# Patient Record
Sex: FEMALE | Race: WHITE | Employment: FULL TIME
[De-identification: names, ages, dates, MRNs, and addresses within clinical notes are randomized per-mention and may not be internally consistent; named-entity substitution may affect disease eponyms.]

---

## 2017-07-08 ENCOUNTER — HEALTH MAINTENANCE LETTER (OUTPATIENT)
Age: 57
End: 2017-07-08

## 2017-07-24 ENCOUNTER — OFFICE VISIT (OUTPATIENT)
Dept: SLEEP MEDICINE | Facility: CLINIC | Age: 57
End: 2017-07-24
Payer: COMMERCIAL

## 2017-07-24 VITALS
SYSTOLIC BLOOD PRESSURE: 134 MMHG | TEMPERATURE: 98.1 F | OXYGEN SATURATION: 97 % | HEIGHT: 67 IN | HEART RATE: 72 BPM | WEIGHT: 286 LBS | DIASTOLIC BLOOD PRESSURE: 82 MMHG | BODY MASS INDEX: 44.89 KG/M2

## 2017-07-24 DIAGNOSIS — G47.33 OSA (OBSTRUCTIVE SLEEP APNEA): Primary | ICD-10-CM

## 2017-07-24 PROCEDURE — 99243 OFF/OP CNSLTJ NEW/EST LOW 30: CPT | Performed by: PSYCHIATRY & NEUROLOGY

## 2017-07-24 RX ORDER — SERTRALINE HYDROCHLORIDE 100 MG/1
100 TABLET, FILM COATED ORAL DAILY
COMMUNITY

## 2017-07-24 RX ORDER — ASPIRIN 81 MG/1
81 TABLET ORAL DAILY
COMMUNITY

## 2017-07-24 RX ORDER — POLYETHYLENE GLYCOL 3350 17 G/17G
17 POWDER, FOR SOLUTION ORAL DAILY
COMMUNITY

## 2017-07-24 RX ORDER — ACETAMINOPHEN 500 MG
500-1000 TABLET ORAL EVERY 6 HOURS PRN
COMMUNITY
End: 2017-09-11

## 2017-07-24 RX ORDER — DEXTROAMPHETAMINE SACCHARATE, AMPHETAMINE ASPARTATE MONOHYDRATE, DEXTROAMPHETAMINE SULFATE AND AMPHETAMINE SULFATE 5; 5; 5; 5 MG/1; MG/1; MG/1; MG/1
20 CAPSULE, EXTENDED RELEASE ORAL DAILY
COMMUNITY

## 2017-07-24 RX ORDER — ATORVASTATIN CALCIUM 40 MG/1
40 TABLET, FILM COATED ORAL DAILY
COMMUNITY
End: 2017-09-07

## 2017-07-24 NOTE — PATIENT INSTRUCTIONS

## 2017-07-24 NOTE — NURSING NOTE
"Chief Complaint   Patient presents with     Sleep Problem     Referred by Dr. Borrero, Insomnia and fatigue       Initial /82  Pulse 72  Temp 98.1  F (36.7  C) (Oral)  Ht 1.689 m (5' 6.5\")  Wt 129.7 kg (286 lb)  SpO2 97%  BMI 45.47 kg/m2 Estimated body mass index is 45.47 kg/(m^2) as calculated from the following:    Height as of this encounter: 1.689 m (5' 6.5\").    Weight as of this encounter: 129.7 kg (286 lb).  Medication Reconciliation: complete   Neck 42cm  ESS 6/24  Yanique Mason MA      "

## 2017-07-24 NOTE — MR AVS SNAPSHOT
After Visit Summary   7/24/2017    Jacquie Giang    MRN: [id number]           Patient Information     Date Of Birth          1960        Visit Information        Provider Department      7/24/2017 2:00 PM Kodi Bentley MD [facility]        Today's Diagnoses     ASHWIN (obstructive sleep apnea)    -  1      Care Instructions      Your BMI is Body mass index is 45.47 kg/(m^2).  Weight management is a personal decision.  If you are interested in exploring weight loss strategies, the following discussion covers the approaches that may be successful. Body mass index (BMI) is one way to tell whether you are at a healthy weight, overweight, or obese. It measures your weight in relation to your height.  A BMI of 18.5 to 24.9 is in the healthy range. A person with a BMI of 25 to 29.9 is considered overweight, and someone with a BMI of 30 or greater is considered obese. More than two-thirds of American adults are considered overweight or obese.  Being overweight or obese increases the risk for further weight gain. Excess weight may lead to heart disease and diabetes.  Creating and following plans for healthy eating and physical activity may help you improve your health.  Weight control is part of healthy lifestyle and includes exercise, emotional health, and healthy eating habits. Careful eating habits lifelong are the mainstay of weight control. Though there are significant health benefits from weight loss, long-term weight loss with diet alone may be very difficult to achieve- studies show long-term success with dietary management in less than 10% of people. Attaining a healthy weight may be especially difficult to achieve in those with severe obesity. In some cases, medications, devices and surgical management might be considered.  What can you do?  If you are overweight or obese and are interested in methods for weight loss, you should discuss this with your provider.      Consider reducing daily calorie intake by 500 calories.     Keep a food journal.     Avoiding skipping meals, consider cutting portions instead.    Diet combined with exercise helps maintain muscle while optimizing fat loss. Strength training is particularly important for building and maintaining muscle mass. Exercise helps reduce stress, increase energy, and improves fitness. Increasing exercise without diet control, however, may not burn enough calories to loose weight.       Start walking three days a week 10-20 minutes at a time    Work towards walking thirty minutes five days a week     Eventually, increase the speed of your walking for 1-2 minutes at time    In addition, we recommend that you review healthy lifestyles and methods for weight loss available through the National Institutes of Health patient information sites:  http://win.niddk.nih.gov/publications/index.htm    And look into health and wellness programs that may be available through your health insurance provider, employer, local community center, or blake club.    Weight management plan: Patient was referred to their PCP to discuss a diet and exercise plan.              Follow-ups after your visit        Your next 10 appointments already scheduled     Aug 24, 2017  9:00 AM CDT   New Patient Visit with [facility]   [facility] ([facility])    [facility] [address]  [address]  [address]  [facility] [address]   [phone]            Aug 25, 2017  8:30 PM CDT   PSG Split with BED 4 SH SLEEP   [facility])    [address]  [facility] [address]   [phone]            Sep 11, 2017  1:30 PM CDT   Return Sleep Patient with Kodi Bentley MD   [facility])    [address]  [facility] [address]   [phone]              Future tests  "that were ordered for you today     Open Future Orders        Priority Expected Expires Ordered    Comprehensive Sleep Study Routine  2018            Who to contact     If you have questions or need follow up information about today's clinic visit or your schedule please contact [facility] directly at [phone].  Normal or non-critical lab and imaging results will be communicated to you by MyChart, letter or phone within 4 business days after the clinic has received the results. If you do not hear from us within 7 days, please contact the clinic through [email or web address] or phone. If you have a critical or abnormal lab result, we will notify you by phone as soon as possible.  Submit refill requests through Flud or call your pharmacy and they will forward the refill request to us. Please allow 3 business days for your refill to be completed.          Additional Information About Your Visit        [email or web address] Information     Flud lets you send messages to your doctor, view your test results, renew your prescriptions, schedule appointments and more. To sign up, go to [email or web address] . Click on \"Log in\" on the left side of the screen, which will take you to the Welcome page. Then click on \"Sign up Now\" on the right side of the page.     You will be asked to enter the access code listed below, as well as some personal information. Please follow the directions to create your username and password.     Your access code is: [email or web address]  Expires: 10/22/2017  5:09 PM     Your access code will  in 90 days. If you need help or a new code, please call your [facility] clinic or [phone].        Care EveryWhere ID     This is your Care EveryWhere ID. This could be used by other organizations to access your [facility] medical records  [id number]        Your Vitals Were     Pulse Temperature Height Pulse Oximetry BMI (Body Mass Index)       72 98.1  F (36.7  C) (Oral) 1.689 m (5' 6.5\") " 97% 45.47 kg/m2        Blood Pressure from Last 3 Encounters:   07/24/17 134/82   12/11/10 116/74    Weight from Last 3 Encounters:   07/24/17 129.7 kg (286 lb)   12/11/10 126.5 kg (278 lb 12.8 oz)                 Today's Medication Changes          These changes are accurate as of: 7/24/17  5:09 PM.  If you have any questions, ask your nurse or doctor.               These medicines have changed or have updated prescriptions.        Dose/Directions    LEVOTHYROXINE SODIUM PO   This may have changed:  Another medication with the same name was removed. Continue taking this medication, and follow the directions you see here.        Dose:  0.15 mcg   Take 0.15 mcg by mouth daily   Refills:  0         Stop taking these medicines if you haven't already. Please contact your care team if you have questions.     ADVIL 200 MG capsule   Generic drug:  ibuprofen           CALCIUM 500 PO           FISH OIL           MULTIVITAMIN PO           neomycin-polymyxin-dexamethasone 3.5-06433-0.1 Oint ophthalmic ointment   Commonly known as:  MAXITROL           simvastatin 40 MG tablet   Commonly known as:  ZOCOR           venlafaxine 75 MG tablet   Commonly known as:  EFFEXOR           VITAMIN D PO                    Primary Care Provider Office Phone # Fax #    [place] Ritchie Pemberton [phone]       [facility] [address]  [place] [address]        Equal Access to Services     ADOLPH JIMÉNEZ AH: Hadii aad ku hadasho Soomaali, waaxda luqadaha, qaybta kaalmada adeegyada, waxsamantha argueta. So [facility] [phone].    ATENCIÓN: Si habla español, tiene a guillen disposición servicios gratuitos de asistencia lingüística. Danay al [phone].    We comply with applicable federal civil rights laws and [place] laws. We do not discriminate on the basis of race, color, national origin, age, disability sex, sexual orientation or gender identity.            Thank you!     Thank you for choosing  [facility]  for your care. Our goal is always to provide you with excellent care. Hearing back from our patients is one way we can continue to improve our services. Please take a few minutes to complete the written survey that you may receive in the mail after your visit with us. Thank you!             Your Updated Medication List - Protect others around you: Learn how to safely use, store and throw away your medicines at www.disposemymeds.org.          This list is accurate as of: 7/24/17  5:09 PM.  Always use your most recent med list.                   Brand Name Dispense Instructions for use Diagnosis    ALPRAZOLAM PO      Take 1 mg by mouth as needed for anxiety        amphetamine-dextroamphetamine 20 MG per 24 hr capsule    ADDERALL XR     Take 20 mg by mouth daily        aspirin EC 81 MG EC tablet      Take 81 mg by mouth daily        atenolol 25 MG tablet    TENORMIN     Take 25 mg by mouth daily.        atorvastatin 40 MG tablet    LIPITOR     Take 40 mg by mouth daily        DEXTROAMPHETAMINE SULFATE PO      Take 20 mg by mouth        hydrochlorothiazide 25 MG tablet    HYDRODIURIL     Take 50 mg by mouth daily        LEVOTHYROXINE SODIUM PO      Take 0.15 mcg by mouth daily        polyethylene glycol powder    MIRALAX/GLYCOLAX     Take 17 g by mouth daily        sertraline 100 MG tablet    ZOLOFT     Take 100 mg by mouth daily        TYLENOL 500 MG tablet   Generic drug:  acetaminophen      Take 500-1,000 mg by mouth every 6 hours as needed for mild pain

## 2017-07-24 NOTE — PROGRESS NOTES
"Sleep Consultation Note:    Date on this visit: 7/24/2017    Jacquie Giang is sent by Dr. Luis Borrero.    Primary Physician: Rosemarie Pemberton     CC:  Referred by Dr. Borrero to look into connection between mental health and her sleep.    Jacquie Giang 57 year old female with PMH Hyperlipidemia, HTN, Hypothyroidism, Depression, Anxiety, Obesity, and recent vasovagal episodes.  She reported she is currently receiving transcranial magnetic stimulation with Dr. Borrero, her psychiatrist.  She reported a past diagnosis of obstructive sleep apnea with a previous sleep study, in 2012 at [facility] (report not available at time of consultation).  She thought she had a score of \"19\" [?AHI] and was told she had \"mild\" sleep apnea. She said she used her CPAP for about 4 months, but then stopped using it because she couldn't not tolerate the noise.      Ms. Giang reported what she said was a  40 year history of feeling depressed and  sleeping 12 hours at night, then taking 3 hour naps during the day when not working.  However, the past 3 weeks has had difficulty initiating sleep taking 4-5 hours to fall asleep then can sleep without difficulty for 10+ hours.  She said that she started taking melatonin 1.5mg about 5 hours prior to sleep as recommended by her psychiatrist and started using a light box for 20-30 min every morning.  She said she has taken the melatonin the past couple days and has had less difficulty initiating sleep.  She reported she is still prescribed Adderal and Amphetamine Salts, but did not feel have been helpful.  She  Reported she does not take xanax everyday.    Sleep Disordered Breathing  Jacquie does complain of snoring, choking/gasping for air, dry mouth, non-refreshing sleep, daytime sleepiness/fatigue. Jacquie has lost 10 lbs over the past month, intentional.  Had a sleep study in 2012 ([facility]) and dx with ASHWIN.---gained about 10-15 lbs/yr since " then.    Sleep Schedule/Sleep Complaints  Recently complains of difficulty initiating sleep the past 3 weeks, but prior to that no difficulty falling asleep.  Took melatonin 1.5mg qnight the past three weeks. Jacquie goes to bed at 9 PM, and it usually takes 30 minutes to fall asleep but prior to starting melatonin last week she had a 3-week  history of taking 4-5 hours to fall asleep.     Jacquie does not complain of restlessness feelings in the legs.    Patient does complain of chronic pain, stress/anxiety, depression, which affects the onset of sleep.    Patient does not use electronics in bed -   Patient does not have a regular bed partner.  Patient sleeps on her side.  Patient does not have any pets in the bedroom at night during sleep.  She does use melatonin for a sleep aid, but only the past few days.    She does not complain of difficulty with staying asleep.  She wakes up 1 time throughout the night.  Night time awakenings occurs due to need to urinate.    Patient does not complain of chronic pain, ruminating thoughts, stress/anxiety, depression, which affects the maintenance of sleep.    After awakening, She is able to fall back asleep after a few minutes.    She wakes up at AM 9-10 with an alarm.    On non-work days, She falls asleep at 8-10PM and gets up 9-10AM, and currently on disability.  She would naturally to go to sleep at 7pm-10am    Sleep Behaviors  She denies any cataplexy, sleep paralysis, sleep hallucinations.    She denies any night time behaviors - sleep walking, sleep eating.  Some sleep talking in past.    She does not complain acting out dreams.    Daytime Functioning  Currently jacquie naps everyday for three hours.  Feels refreshed after naps, but takes about an hour after sleeping to feel awake.     She does not doze off during the day -     She denies dozing while driving.      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  Work schedule is as follows:   *8-430pm (occasionally until 7 or 8pm).  She does not do shift work currently or in the past. In the past.  She does not drink caffeine, but was drinking about 4-6 drinks/day until 7 weeks ago. Last caffeine intake was within 6 hours of bed time until 7 weeks ago.  She drinks alcohol, 1-2drinks/night until last 7 weeks when she has drank a few times (3x).   Has used alcohol as a sleep aid a few times.  Patient is former/smoker 1 pack/week  1 year.  Denies any secondhand exposure.  Patient does not use drugs.  No future surgeries planned.    Labs: 7/3/17: na 138, k 4.2, cl 95, CO2 28, BUN 21, CR 1.45      Allergies:    Allergies   Allergen Reactions     Wellbutrin [Bupropion Hcl]        Medications:   Current Outpatient Prescriptions   Medication Sig Dispense Refill     atorvastatin (LIPITOR) 40 MG tablet Take 40 mg by mouth daily       LEVOTHYROXINE SODIUM PO Take 0.15 mcg by mouth daily       sertraline (ZOLOFT) 100 MG tablet Take 100 mg by mouth daily       amphetamine-dextroamphetamine (ADDERALL XR) 20 MG per 24 hr capsule Take 20 mg by mouth daily       ALPRAZOLAM PO Take 1 mg by mouth as needed for anxiety       DEXTROAMPHETAMINE SULFATE PO Take 20 mg by mouth       aspirin EC 81 MG EC tablet Take 81 mg by mouth daily       polyethylene glycol (MIRALAX/GLYCOLAX) powder Take 17 g by mouth daily       acetaminophen (TYLENOL) 500 MG tablet Take 500-1,000 mg by mouth every 6 hours as needed for mild pain       atenolol (TENORMIN) 25 MG tablet Take 25 mg by mouth daily.       hydrochlorothiazide (HYDRODIURIL) 25 MG tablet Take 50 mg by mouth daily          Problem List:  Patient Active Problem List    Diagnosis Date Noted     Dermatitis, eyelid, contact 08/12/2014     Priority: Medium        Past Medical/Surgical History:  No past medical history on file.  No past surgical history on file.    Social History:  Social History     Social History     Marital status:      Spouse name: N/A     Number of children:  "N/A     Years of education: N/A     Occupational History     Not on file.     Social History Main Topics     Smoking status: Never Smoker     Smokeless tobacco: Not on file     Alcohol use Yes     Drug use: No     Sexual activity: Not on file     Other Topics Concern     Not on file     Social History Narrative       Family History:  No family history on file.    Review of Systems:    CONSTITUTIONAL: Recent intentional 10 lb weight loss, feels sweats a lot because overweight.  EYES: feels eyesight worsening due to age.  ENT: some mucus draining from nose at times NEGATIVE for ear pain, sore throat, sinus pain, bloody nose  CARDIAC: some rapid heart beat due to anxiety, and swelling in legs.  Reported having a vasovagal episode recently.  NEUROLOGIC: headaches due to recent transcranial magnetic stimulation.  DERMATOLOGIC: NEGATIVE for rashes, new moles or change in mole(s)  PULMONARY: NEGATIVE SOB at rest, SOB with activity, dry cough, productive cough, coughing up blood, wheezing or whistling when breathing.    GASTROINTESTINAL: some constipation. NEGATIVE for nausea or vomitting, loose or watery stools, fat or grease in stools, abdominal pain, bowel movements black in color or blood noted.  GENITOURINARY: urinating more frequently than usual.  NEGATIVE for pain during urination, blood in urine,   MUSCULOSKELETAL: NEGATIVE for muscle pain, bone or joint pain, swollen joints.  ENDOCRINE: NEGATIVE for increased thirst or urination, diabetes.  LYMPHATIC: NEGATIVE for swollen lymph nodes, lumps or bumps in the breasts or nipple discharge.  PSYCHE: depression, anxiety    Physical Examination:  Vitals: /82  Pulse 72  Temp 98.1  F (36.7  C) (Oral)  Ht 1.689 m (5' 6.5\")  Wt 129.7 kg (286 lb)  SpO2 97%  BMI 45.47 kg/m2  BMI= Body mass index is 45.47 kg/(m^2).    Neck Cir (cm): 42 cm    [place] Total Score 7/24/2017   Total score - [place] 6       General: Appropriately groomed, did not appear sleepy during " interview  Eyes: no icterus, PERRL  Nose: Nares patent. Some inflammation in nares.  Orophraynx:    Mallampati Class: II.   Tonsillar Stage: 1.  Neck: Supple, Circumference: 16.5 inches  Cardiac: Regular rate and rhythm  Chest: Symmetric air movement, lungs clear to auscultation bilaterally  Musculoskeletal: slight non-pitting edema lower extremities noted  Psych: pleasant, Mood reported as depressed, affect congruent, tearful at times.  Mental status: Awake, alert, attentive, oriented.      Impression/Plan:    Snoring  Obstructive sleep apnea  Hypersomnia    Snoring  Obstructive sleep apnea    Ms. Giang has a past diagnosis of ASHWIN, and has symptoms of ASHWIN including a clinical history of snoring, choking/gasping for air, dry mouth, non-refreshing sleep, daytime sleepiness/fatigue.  .  Physical exam significant for Mallampati II, and increased neck circumference.  STOP BANG score of 5.  No past PSG available at time of consult and has gained approx. 50 lbs since last PSG.  Will order split night PSG with TCM due to BMI.    Hypersomnia    Ms. Giang reported a long history of hypersomnia.  Depression may be a cause of her  hypersomnia and she is currently being treated with Transcranial Magnetic Stimulation.  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  I recommend she continue using the light box int he morning and evening melatonin for now and will monitor status of her hypersomnia in light of upcoming results of PSG.     Encourage weight loss, healthy diet, and exercise.    Patient was strongly advised to avoid driving, operating any heavy machinery or other hazardous situations while drowsy or sleepy.  Patient was counseled on the importance of driving while alert, to pull over if drowsy, or nap before getting into the vehicle if sleepy.      She will follow up with me in approximately two weeks after her sleep  study has been competed to review the results and discuss plan of care.        CC: Dr. Luis Borrero      Seen and examined with Dr. Mc Casillas MD  Clinical Sleep Medicine Fellow  Pager #[phone]    Sleep Staff Addendum  I have seen and evaluated the patient today with the sleep fellow and agree with the documentation.  Also discussed the challenging nature of the case with the patients primary psychiatrist Dr Borrero.        JOEY FULTON MD

## 2017-08-25 ENCOUNTER — THERAPY VISIT (OUTPATIENT)
Dept: SLEEP MEDICINE | Facility: CLINIC | Age: 57
End: 2017-08-25
Payer: COMMERCIAL

## 2017-08-25 DIAGNOSIS — G47.33 OSA (OBSTRUCTIVE SLEEP APNEA): ICD-10-CM

## 2017-08-25 PROCEDURE — 95810 POLYSOM 6/> YRS 4/> PARAM: CPT | Performed by: PSYCHIATRY & NEUROLOGY

## 2017-08-25 NOTE — MR AVS SNAPSHOT
After Visit Summary   8/25/2017    Jacquie Giang    MRN: [id number]           Patient Information     Date Of Birth          1960        Visit Information        Provider Department      8/25/2017 8:30 PM BED 4 SH [facility]        Today's Diagnoses     ASHWIN (obstructive sleep apnea)          Care Instructions    [facility]    1. Your sleep study will be reviewed by a sleep physician within the next few days.     2. Please follow up in the sleep clinic as scheduled, or, make an appointment with your sleep provider to be seen within two weeks to discuss the results of the sleep study.    3. If you have any questions or problems with your treatment plan, please contact your sleep clinic provider at [phone] to further manage your condition.    4. Please review your attached medication list, and, at your follow-up appointment advise your sleep clinic provider about any changes.    5. Go to [email or web address] for more information about your sleep problems.    MICHELLE Santos  August 26, 2017                Follow-ups after your visit        Your next 10 appointments already scheduled     Sep 07, 2017 11:00 AM CDT   New Patient Visit with AFTAB [facility]   [facility] ([facility])    [facility] [address]  [address]  [facility] [address] [phone]            Sep 11, 2017  1:30 PM CDT   Return Sleep Patient with Kodi Bentley MD   [facility] ([facility])    [address] [place] [address] [phone]              Who to contact     If you have questions or need follow up information about today's clinic visit or your schedule please contact [facility] directly at [phone].  Normal or non-critical lab and imaging results will be communicated to you by Evy  "letter or phone within 4 business days after the clinic has received the results. If you do not hear from us within 7 days, please contact the clinic through Oyster or phone. If you have a critical or abnormal lab result, we will notify you by phone as soon as possible.  Submit refill requests through Oyster or call your pharmacy and they will forward the refill request to us. Please allow 3 business days for your refill to be completed.          Additional Information About Your Visit        Oyster Information     Oyster lets you send messages to your doctor, view your test results, renew your prescriptions, schedule appointments and more. To sign up, go to [email or web address] . Click on \"Log in\" on the left side of the screen, which will take you to the Welcome page. Then click on \"Sign up Now\" on the right side of the page.     You will be asked to enter the access code listed below, as well as some personal information. Please follow the directions to create your username and password.     Your access code is: [email or web address]  Expires: 10/22/2017  5:09 PM     Your access code will  in 90 days. If you need help or a new code, please call your [place] clinic or [phone].        Care EveryWhere ID     This is your Care EveryWhere ID. This could be used by other organizations to access your [place] medical records  [id number]         Blood Pressure from Last 3 Encounters:   17 134/82   12/11/10 116/74    Weight from Last 3 Encounters:   17 129.7 kg (286 lb)   12/11/10 126.5 kg (278 lb 12.8 oz)              We Performed the Following     Comprehensive Sleep Study        Primary Care Provider Office Phone # Fax #    [place] Ritchie Pemberton [phone]       [address]  [facility] [address]        Equal Access to Services     ADOLPH JIMÉNEZ AH: Guadalupe sro Soradha, waaxda luqadaha, qaybta kaalmada adeegyada, waxay edita argueta. So wa " [phone].    ATENCIÓN: Si roselyn villegas, tiene a guillen disposición servicios gratuitos de asistencia lingüística. Danay kendall [phone].    We comply with applicable federal civil rights laws and [place] laws. We do not discriminate on the basis of race, color, national origin, age, disability sex, sexual orientation or gender identity.            Thank you!     Thank you for choosing [facility]  for your care. Our goal is always to provide you with excellent care. Hearing back from our patients is one way we can continue to improve our services. Please take a few minutes to complete the written survey that you may receive in the mail after your visit with us. Thank you!             Your Updated Medication List - Protect others around you: Learn how to safely use, store and throw away your medicines at www.disposemymeds.org.          This list is accurate as of: 8/25/17 11:59 PM.  Always use your most recent med list.                   Brand Name Dispense Instructions for use Diagnosis    ALPRAZOLAM PO      Take 1 mg by mouth as needed for anxiety        amphetamine-dextroamphetamine 20 MG per 24 hr capsule    ADDERALL XR     Take 20 mg by mouth daily        aspirin EC 81 MG EC tablet      Take 81 mg by mouth daily        atenolol 25 MG tablet    TENORMIN     Take 25 mg by mouth daily.        atorvastatin 40 MG tablet    LIPITOR     Take 40 mg by mouth daily        DEXTROAMPHETAMINE SULFATE PO      Take 20 mg by mouth        hydrochlorothiazide 25 MG tablet    HYDRODIURIL     Take 50 mg by mouth daily        LEVOTHYROXINE SODIUM PO      Take 0.15 mcg by mouth daily        polyethylene glycol powder    MIRALAX/GLYCOLAX     Take 17 g by mouth daily        sertraline 100 MG tablet    ZOLOFT     Take 100 mg by mouth daily        TYLENOL 500 MG tablet   Generic drug:  acetaminophen      Take 500-1,000 mg by mouth every 6 hours as needed for mild pain

## 2017-08-26 NOTE — PATIENT INSTRUCTIONS
[facility]    1. Your sleep study will be reviewed by a sleep physician within the next few days.     2. Please follow up in the sleep clinic as scheduled, or, make an appointment with your sleep provider to be seen within two weeks to discuss the results of the sleep study.    3. If you have any questions or problems with your treatment plan, please contact your sleep clinic provider at [phone] to further manage your condition.    4. Please review your attached medication list, and, at your follow-up appointment advise your sleep clinic provider about any changes.    5. Go to [email or web address] for more information about your sleep problems.    MICHELLE Santos  August 26, 2017

## 2017-08-28 NOTE — PROCEDURES
" SLEEP STUDY INTERPRETATION  POLYSOMNOGRAPHY REPORT      Patient: Jacquie Giang  YOB: 1960  Study Date: 8/25/2017  MRN: [id number]  Referring Provider: -  Ordering Provider: Kodi Bentley MD    Indications for Polysomnography: The patient is a 57 y old Female who is 5' 6\" and weighs 286.0 lbs.  Her BMI is 46.5, [place] sleepiness scale 6.0 and neck size is 42.0.  Relevant medical history includes snoring, ? apneas. A diagnostic polysomnogram was performed to evaluate for sleep apnea.    Polysomnogram Data:  A full night polysomnogram recorded the standard physiologic parameters including EEG, EOG, EMG, ECG, nasal and oral airflow.  Respiratory parameters of chest and abdominal movements were recorded with respiratory inductance plethysmography.  Oxygen saturation was recorded by pulse oximetry.      Sleep Architecture: Sleep fragmentation  The total recording time of the polysomnogram was 454.8 minutes.  The total sleep time was 353.5 minutes.  Sleep latency was 20.9 minutes.  REM latency was 204.5 minutes.  Arousal index was 57.7 arousals per hour.  Sleep efficiency was 77.7%.  Wake after sleep onset was 69.0 minutes.  The patient spent 13.2% of total sleep time in Stage N1, 61.1% in Stage N2, 15.4% in Stages N3, and 10.3% in REM.  Time in REM supine was 0 minutes.    Respiration: Mild ASHWIN, this may be an underestimation due to the lack of supine sleep.    Events - The polysomnogram revealed a presence of 23 obstructive, 2 central, and - mixed apneas resulting in an apnea index of 4.2 events per hour.  There were 60 hypopneas resulting in a hypopnea index of 10.2 events per hour.  The combined apnea/hypopnea index was 14.4 events per hour.  The REM AHI was 72.3 events per hour.  The supine AHI was - events per hour.  The RERA index was 15.3 events per hour.   The RDI was 29.7 events per hour.    Snoring - was mild.    Respiratory rate and pattern - was notable for normal respiratory rate and " pattern.    Sustained Sleep Associated Hypoventilation - Transcutaneous carbon dioxide monitoring was used, however significant hypoventilation was not present.    Sleep Associated Hypoxemia - (Greater than 5 minutes O2 sat below 89%) was not present.  Baseline oxygen saturation was 92.2%. Lowest oxygen saturation was 82.1%.  Time spent less than or equal to 88% was 1.1 minutes.  Time spent less than or equal to 89% was 4.8 minutes.  29.7 15.3 14.4     Movement Activity:     Periodic Limb Activity - There were 0 PLMs during the entire study.    REM EMG Activity - Excessive muscle activity was not present.    Nocturnal Behavior - Abnormal sleep related behaviors were not noted.    Bruxism - None apparent.    Cardiac Summary: Sinus  The average pulse rate was 65.9 bpm.  The minimum pulse rate was 51.0 bpm while the maximum pulse rate was 82.1 bpm.     Assessment:     Mild ASHWIN, this may be an underestimation due to the lack of supine sleep.    Recommendations:    If deemed clinically relevant Mild ASHWIN can be treated with the following options:  o Dental Appliance  o Auto CPAP  o Upper Airway Surgery    Advise regarding the risks of drowsy driving.    Suggest optimizing sleep schedule and avoiding sleep deprivation.    Weight management     Diagnostic Code(s): G47.33, G47.9      Kodi Bentley MD 8-

## 2017-09-07 ENCOUNTER — OFFICE VISIT (OUTPATIENT)
Dept: OBGYN | Facility: CLINIC | Age: 57
End: 2017-09-07
Attending: NURSE PRACTITIONER
Payer: COMMERCIAL

## 2017-09-07 VITALS
HEART RATE: 88 BPM | SYSTOLIC BLOOD PRESSURE: 125 MMHG | WEIGHT: 263.5 LBS | BODY MASS INDEX: 41.36 KG/M2 | HEIGHT: 67 IN | DIASTOLIC BLOOD PRESSURE: 74 MMHG

## 2017-09-07 DIAGNOSIS — I10 ESSENTIAL HYPERTENSION: ICD-10-CM

## 2017-09-07 DIAGNOSIS — E66.01 MORBID OBESITY WITH BMI OF 40.0-44.9, ADULT (H): ICD-10-CM

## 2017-09-07 DIAGNOSIS — N95.1 SYMPTOMATIC MENOPAUSAL OR FEMALE CLIMACTERIC STATES: Primary | ICD-10-CM

## 2017-09-07 PROBLEM — E55.9 VITAMIN D INSUFFICIENCY: Status: ACTIVE | Noted: 2017-09-07

## 2017-09-07 PROBLEM — F33.9 MAJOR DEPRESSION, RECURRENT (H): Status: ACTIVE | Noted: 2017-09-07

## 2017-09-07 PROBLEM — E78.5 HYPERLIPIDEMIA: Status: ACTIVE | Noted: 2017-09-07

## 2017-09-07 PROBLEM — F41.9 ANXIETY: Status: ACTIVE | Noted: 2017-09-07

## 2017-09-07 PROBLEM — G47.00 INSOMNIA: Status: ACTIVE | Noted: 2017-09-07

## 2017-09-07 PROBLEM — E03.9 HYPOTHYROIDISM: Status: ACTIVE | Noted: 2017-09-07

## 2017-09-07 PROBLEM — G47.33 OSA ON CPAP: Status: ACTIVE | Noted: 2017-09-07

## 2017-09-07 PROCEDURE — 99213 OFFICE O/P EST LOW 20 MIN: CPT | Mod: ZF

## 2017-09-07 RX ORDER — LANOLIN ALCOHOL/MO/W.PET/CERES
3 CREAM (GRAM) TOPICAL
COMMUNITY
Start: 2017-07-19

## 2017-09-07 RX ORDER — TRIAMCINOLONE ACETONIDE 1 MG/G
CREAM TOPICAL
COMMUNITY
Start: 2017-06-16 | End: 2017-09-11

## 2017-09-07 RX ORDER — ACETAMINOPHEN 160 MG
2000 TABLET,DISINTEGRATING ORAL
COMMUNITY
Start: 2017-07-19

## 2017-09-07 RX ORDER — ASPIRIN 81 MG/1
81 TABLET ORAL
COMMUNITY
Start: 2013-08-14 | End: 2017-09-07

## 2017-09-07 RX ORDER — ATORVASTATIN CALCIUM 40 MG/1
40 TABLET, FILM COATED ORAL DAILY
COMMUNITY

## 2017-09-07 ASSESSMENT — PAIN SCALES - GENERAL: PAINLEVEL: NO PAIN (0)

## 2017-09-07 NOTE — PATIENT INSTRUCTIONS

## 2017-09-07 NOTE — MR AVS SNAPSHOT
After Visit Summary   9/7/2017    Jacquie Giang    MRN: [id number]           Patient Information     Date Of Birth          1960        Visit Information        Provider Department      9/7/2017 11:00 AM Zahraa Rowe APRN CNP [facility]        Today's Diagnoses     Symptomatic menopausal or female climacteric states    -  1      Care Instructions      PREVENTIVE HEALTH RECOMMENDATIONS:   Most women need a yearly breast and pelvic exam.    A PAP screen, a test done DURING a pelvic exam, is NO longer recommended yearly.    March 2013, screening guidelines recommended by ACOG for PAP screen are:    1) First pap at age 21.    2) Pap every 3 years until age 30.    3) After age 30, pap every 3 years or Pap with HR HPV screen every 5 years until age 65.  4) Women do NOT need a vaginal Pap screen after a hysterectomy (surgical removal of the uterus) when they have not had cancer.    Exceptions:  1) Yearly pap if HIV+ or immunosuppressed secondary to organ transplant  2) BLU II-III continue routine screening for 20 years.    I encourage you continue looking for opportunities to choose a healthy lifestyle:       * Choose to eat a heart healthy diet. Check out the FOOD PLATE guidelines at: http://www.choosemyplate.gov/ for helpful hints on weight and cholesterol management.  Balance your caloric intake with exercise to maintain a BMI in the 22 to 26 range. For bone health: Eat calcium-rich foods like yogurt, broccoli or take chewable calcium pills (500 to 600 mg) twice a day with food.       * Exercise for at least an average of 30 minutes a day, 5 days of the week. This will help you control your weight, release stress, and help prevent disease.      * Take a Vitamin D3 supplement daily fall through spring and during summer unless you ymil40-95' full body sun exposure to skin without sunscreen.      * DO wear sunscreen to prevent skin cancer after the first 15-30 minutes.      *  Identify stressors in your life, find ways to release the stress, and, make time for yourself. PLEASE ask for help if mood changes last longer than two weeks.     * Limit alcohol to one drink per day.  No smoking.  Avoid second hand smoke. If you smoke, ask for help to stop.       *  If you are in a sexual relationship, talk with your partner about possible infection risks and take action to protect yourself from exposure to a sexual infection.    Please request an infection screen for STIs (sexually transmitted infections) if you are less than age 26 OR believe that you may be at risk.     Get a flu shot each year. Get a tetanus shot every 10 years. EVERYONE needs a pertussis (Whooping cough) booster.    See your dentist twice a year for an exam and preventive care cleaning.     Consider the following screen tests:    1) cholesterol test every 5 years.     2) yearly mammogram after age 40 unless you have identified risks.    3) colonoscopy every 10 years after age 50 unless you have identified risks.    4) diabetes blood test screening if you are at risk for diabetes.      Additional information that you may also find helpful:  The Internet now gives us access to LOTS of information -- some of it helpful, research documented and also plenty of harmful, anecdotal information that may not pertain to your situtaion. Consider visiting the following websites for accurate health information:    www.vitamindcouncil.org/ : Info and ongoing research re Vitamin D    www.fairview.org : Up to date and easily searchable information on multiple topics.    www.medlineplus.gov : medication info, interactive tutorials, watch real surgeries online    www.cdc.gov : public health info, travel advisories, epidemics (H1N1)    www.niya/std.org: current research re diagnosis, treatment and prevention of sexually contacted infections.    www.health.state.[place].us : [place] dept of heatlh, public health issues in [place], N1N1    www.familydoctor.org :  good info from the Academy of Family Physicians      Reviewed pros and cons of HT. No clear indications for estrogen use to improve mood or excessive sweating  Explained HT not recommended post surgical menoapause beyond 5-51 years of age  Encouraged program work for depression and weight loss, continue with psychiatrist              Follow-ups after your visit        Follow-up notes from your care team     Return in 1 year (on 2018) for Preventative Health Visit.      Your next 10 appointments already scheduled     Sep 11, 2017  1:30 PM CDT   Return Sleep Patient with Kodi Bentley MD   [facility] ([facility])    [address] [address] [phone]              Who to contact     Please call your clinic at [phone] to:    Ask questions about your health    Make or cancel appointments    Discuss your medicines    Learn about your test results    Speak to your doctor   If you have compliments or concerns about an experience at your clinic, or if you wish to file a complaint, please contact [facility] Phys[facility] Patient Relations at [phone] or email us at [email or web address]         Additional Information About Your Visit        Airwavz Solutionshart Information     Tranzlogict is an electronic gateway that provides easy, online access to your medical records. With Surreal InkÂº, you can request a clinic appointment, read your test results, renew a prescription or communicate with your care team.     To sign up for Tranzlogict visit the website at [email or web address]   You will be asked to enter the access code listed below, as well as some personal information. Please follow the directions to create your username and password.     Your access code is: [email or web address]  Expires: 10/22/2017  5:09 PM     Your access code will  in 90 days. If you need help or a new code, please contact your [facility]  "Physicians Clinic or call [phone] for assistance.        Care EveryWhere ID     This is your Care EveryWhere ID. This could be used by other organizations to access your [place] medical records  [id number]        Your Vitals Were     Pulse Height BMI (Body Mass Index)             88 1.689 m (5' 6.5\") 41.89 kg/m2          Blood Pressure from Last 3 Encounters:   09/07/17 125/74   07/24/17 134/82   12/11/10 116/74    Weight from Last 3 Encounters:   09/07/17 119.5 kg (263 lb 8 oz)   07/24/17 129.7 kg (286 lb)   12/11/10 126.5 kg (278 lb 12.8 oz)              Today, you had the following     No orders found for display       Primary Care Provider Office Phone # Fax #    Lakshmi Kaur [phone]       [facility] [address]  [facility] [address]        Equal Access to Services     [facility]: Hadii aad ku hadasho Soomaali, waaxda luqadaha, qaybta kaalmada adeegyada, waxay idiin hayaan adeeg kharayaent la'jordi . So [facility] [phone].    ATENCIÓN: Si habla español, tiene a guillen disposición servicios gratuitos de asistencia lingüística. [facility] [phone].    We comply with applicable federal civil rights laws and [place] laws. We do not discriminate on the basis of race, color, national origin, age, disability sex, sexual orientation or gender identity.            Thank you!     Thank you for choosing [facility]  for your care. Our goal is always to provide you with excellent care. Hearing back from our patients is one way we can continue to improve our services. Please take a few minutes to complete the written survey that you may receive in the mail after your visit with us. Thank you!             Your Updated Medication List - Protect others around you: Learn how to safely use, store and throw away your medicines at www.disposemymeds.org.          This list is accurate as of: 9/7/17  1:15 PM.  Always use your most recent med list.                   " Brand Name Dispense Instructions for use Diagnosis    ALPRAZOLAM PO      Take 1 mg by mouth as needed for anxiety        amphetamine-dextroamphetamine 20 MG per 24 hr capsule    ADDERALL XR     Take 20 mg by mouth daily        aspirin EC 81 MG EC tablet      Take 81 mg by mouth daily        atenolol 25 MG tablet    TENORMIN     Take 25 mg by mouth daily.        atorvastatin 40 MG tablet    LIPITOR     Take 40 mg by mouth daily        hydrochlorothiazide 25 MG tablet    HYDRODIURIL     Take 50 mg by mouth daily        LEVOTHYROXINE SODIUM PO      Take 150 mcg by mouth daily        melatonin 3 MG tablet      Take 3 mg by mouth        polyethylene glycol powder    MIRALAX/GLYCOLAX     Take 17 g by mouth daily        sertraline 100 MG tablet    ZOLOFT     Take 100 mg by mouth daily        triamcinolone 0.1 % cream    KENALOG     Use on affected areas 1-2 times daily as needed for itching        TYLENOL 500 MG tablet   Generic drug:  acetaminophen      Take 500-1,000 mg by mouth every 6 hours as needed for mild pain        vitamin D3 2000 UNITS Caps      Take 2,000 Units by mouth

## 2017-09-07 NOTE — LETTER
"2017      RE: Jacquie Giang  [address]  [place] [address]           Progress Note    SUBJECTIVE:  Jacquie Giang is an 57 year old, , here today to discuss  hormones therapy 28 years post surgical menopause  PCP at [facility]    Concerns today include:   1. Depression; Sees psychiatrist, has 40 year history of depression. Feels medications aren't working well,starting a 12 week outpatient program  2. Post menopause; age 39 MARIAA, started on HT after surgery for a few years.. Complains of heavy sweating \"hot all the time\",denies hot flashes. 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  Lives with 82 yo Mom  Exercise: none  Not sexually active    HISTORY:    Current Outpatient Prescriptions on File Prior to Visit:  LEVOTHYROXINE SODIUM PO Take 150 mcg by mouth daily    sertraline (ZOLOFT) 100 MG tablet Take 100 mg by mouth daily   amphetamine-dextroamphetamine (ADDERALL XR) 20 MG per 24 hr capsule Take 20 mg by mouth daily   ALPRAZOLAM PO Take 1 mg by mouth as needed for anxiety   aspirin EC 81 MG EC tablet Take 81 mg by mouth daily   polyethylene glycol (MIRALAX/GLYCOLAX) powder Take 17 g by mouth daily   acetaminophen (TYLENOL) 500 MG tablet Take 500-1,000 mg by mouth every 6 hours as needed for mild pain   atenolol (TENORMIN) 25 MG tablet Take 25 mg by mouth daily.   hydrochlorothiazide (HYDRODIURIL) 25 MG tablet Take 50 mg by mouth daily      No current facility-administered medications on file prior to visit.   Allergies   Allergen Reactions     Wellbutrin [Bupropion Hcl]      Cephalexin Rash     Citalopram Rash     Fluoxetine Rash     Immunization History   Administered Date(s) Administered     TDAP Vaccine (Boostrix) 2013       Obstetric History       " "T0      L0     SAB0   TAB0   Ectopic0   Multiple0   Live Births0      No past medical history on file.  No past surgical history on file.  No family history on file.  Social History     Social History     Marital status:      Spouse name: N/A     Number of children: N/A     Years of education: N/A     Social History Main Topics     Smoking status: Never Smoker     Smokeless tobacco: Never Used     Alcohol use 0.6 - 1.2 oz/week     1 - 2 Standard drinks or equivalent per week     Drug use: No     Sexual activity: Not Currently     Other Topics Concern     None     Social History Narrative    How much exercise per week? none    How much calcium per day? supplement       How much caffeine per day? \"a lot\" of soda    How much vitamin D per day? supplement    Do you/your family wear seatbelts?  Yes    Do you/your family use safety helmets? n/a    Do you/your family use sunscreen? Not in sun    Do you/your family keep firearms in the home? No    Do you/your family have a smoke detector(s)? Yes            2017 Rohan Matta LPN               ROS  [unfilled]  No flowsheet data found.  No flowsheet data found.      EXAM:  Blood pressure 125/74, pulse 88, height 1.689 m (5' 6.5\"), weight 119.5 kg (263 lb 8 oz). Body mass index is 41.89 kg/(m^2).  Constitutional: Distressed woman, alternately crying and laughing, poor historian  Oriented to time and place, and interactive   Psychological: Appropriate mood given major depressive/anxiety disorder  Eyes symmetrical, sclera clear and white, conjunctiva pink and moist   Mouth: moist mucous membranes, no visible dental caries   Skin: warm and dry, no visible rashes or lesions  Neuro: normal gait, no visible tremor   Musculoskeletal: Full ROM, no muscular weakness visible.   45 minutes was spent in direct contact with patient and > 50% of the time in patient education and coordination of care.       ASSESSMENT:  Encounter Diagnosis   Name Primary?     " Symptomatic menopausal or female climacteric states Yes        PLAN:     Orders Placed This Encounter   Medications     Cholecalciferol (VITAMIN D3) 2000 UNITS CAPS     Sig: Take 2,000 Units by mouth     melatonin 3 MG tablet     Sig: Take 3 mg by mouth     triamcinolone (KENALOG) 0.1 % cream     Sig: Use on affected areas 1-2 times daily as needed for itching     DISCONTD: aspirin EC 81 MG EC tablet     Sig: Take 81 mg by mouth     atorvastatin (LIPITOR) 40 MG tablet     Sig: Take 40 mg by mouth daily       Reviewed pros and cons of HT. No clear indications for estrogen use to improve mood or excessive sweating  Explained HT not recommended post surgical menopause beyond 50-51 years of age  Encouraged [facility] program work for depression and weight loss, continue with psychiatrist  Patient agrees with plan.  Midlife Folder on integrative therapies given to patient and discussed    Zahraa GARCIA

## 2017-09-07 NOTE — LETTER
"2017     RE: Jacquie Giang  [address]  [place] [facility], [place] [address]     Dear Colleague,    Thank you for referring your patient, Jacquie Giang, to the [facility] at [facility]. Please see a copy of my visit note below.        Progress Note    SUBJECTIVE:  Jacquie Giang is an 57 year old, , here today to discuss  hormones therapy 28 years post surgical menopause  PCP at [facility]    Concerns today include:   1. Depression; Sees psychiatrist, has 40 year history of depression. Feels medications aren't working well,starting a 12 week outpatient program  2. Post menopause; age 39 [employer], started on HT after surgery for a few years.. Complains of heavy sweating \"hot all the time\",denies hot flashes. 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  Lives with 82 yo Mom  Exercise: none  Not sexually active    HISTORY:    Current Outpatient Prescriptions on File Prior to Visit:  LEVOTHYROXINE SODIUM PO Take 150 mcg by mouth daily    sertraline (ZOLOFT) 100 MG tablet Take 100 mg by mouth daily   amphetamine-dextroamphetamine (ADDERALL XR) 20 MG per 24 hr capsule Take 20 mg by mouth daily   ALPRAZOLAM PO Take 1 mg by mouth as needed for anxiety   aspirin EC 81 MG EC tablet Take 81 mg by mouth daily   polyethylene glycol (MIRALAX/GLYCOLAX) powder Take 17 g by mouth daily   acetaminophen (TYLENOL) 500 MG tablet Take 500-1,000 mg by mouth every 6 hours as needed for mild pain   atenolol (TENORMIN) 25 MG tablet Take 25 mg by mouth daily.   hydrochlorothiazide (HYDRODIURIL) 25 MG tablet Take 50 mg by mouth daily      No current facility-administered medications on file prior to visit.   Allergies   Allergen Reactions     Wellbutrin " "[Bupropion Hcl]      Cephalexin Rash     Citalopram Rash     Fluoxetine Rash     Immunization History   Administered Date(s) Administered     TDAP Vaccine (Boostrix) 2013       Obstetric History       T0      L0     SAB0   TAB0   Ectopic0   Multiple0   Live Births0      No past medical history on file.  No past surgical history on file.  No family history on file.  Social History     Social History     Marital status:      Spouse name: N/A     Number of children: N/A     Years of education: N/A     Social History Main Topics     Smoking status: Never Smoker     Smokeless tobacco: Never Used     Alcohol use 0.6 - 1.2 oz/week     1 - 2 Standard drinks or equivalent per week     Drug use: No     Sexual activity: Not Currently     Other Topics Concern     None     Social History Narrative    How much exercise per week? none    How much calcium per day? supplement       How much caffeine per day? \"a lot\" of soda    How much vitamin D per day? supplement    Do you/your family wear seatbelts?  Yes    Do you/your family use safety helmets? n/a    Do you/your family use sunscreen? Not in sun    Do you/your family keep firearms in the home? No    Do you/your family have a smoke detector(s)? Yes            2017 Rohan Matta LPN               ROS  [unfilled]  No flowsheet data found.  No flowsheet data found.      EXAM:  Blood pressure 125/74, pulse 88, height 1.689 m (5' 6.5\"), weight 119.5 kg (263 lb 8 oz). Body mass index is 41.89 kg/(m^2).  Constitutional: Distressed woman, alternately crying and laughing, poor historian  Oriented to time and place, and interactive   Psychological: Appropriate mood given major depressive/anxiety disorder  Eyes symmetrical, sclera clear and white, conjunctiva pink and moist   Mouth: moist mucous membranes, no visible dental caries   Skin: warm and dry, no visible rashes or lesions  Neuro: normal gait, no visible tremor   Musculoskeletal: Full ROM, no " muscular weakness visible.   45 minutes was spent in direct contact with patient and > 50% of the time in patient education and coordination of care.       ASSESSMENT:  Encounter Diagnosis   Name Primary?     Symptomatic menopausal or female climacteric states Yes        PLAN:     Orders Placed This Encounter   Medications     Cholecalciferol (VITAMIN D3) 2000 UNITS CAPS     Sig: Take 2,000 Units by mouth     melatonin 3 MG tablet     Sig: Take 3 mg by mouth     triamcinolone (KENALOG) 0.1 % cream     Sig: Use on affected areas 1-2 times daily as needed for itching     DISCONTD: aspirin EC 81 MG EC tablet     Sig: Take 81 mg by mouth     atorvastatin (LIPITOR) 40 MG tablet     Sig: Take 40 mg by mouth daily       Reviewed pros and cons of HT. No clear indications for estrogen use to improve mood or excessive sweating  Explained HT not recommended post surgical menopause beyond 50-51 years of age  Encouraged [facility] program work for depression and weight loss, continue with psychiatrist  Patient agrees with plan.  Midlife Folder on integrative therapies given to patient and discussed    Zahraa GARCIA

## 2017-09-07 NOTE — LETTER
"2017      RE: Jacquie Giang  [address]  [place] [address]           Progress Note    SUBJECTIVE:  Jacquie Giang is an 57 year old, , here today to discuss  hormones therapy 28 years post surgical menopause  PCP at [facility]    Concerns today include:   1. Depression; Sees psychiatrist, has 40 year history of depression. Feels medications aren't working well,starting a 12 week outpatient program  2. Post menopause; age 39 [employer], started on HT after surgery for a few years.. Complains of heavy sweating \"hot all the time\",denies hot flashes. 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  Lives with 84 yo Mom  Exercise: none  Not sexually active    HISTORY:    Current Outpatient Prescriptions on File Prior to Visit:  LEVOTHYROXINE SODIUM PO Take 150 mcg by mouth daily    sertraline (ZOLOFT) 100 MG tablet Take 100 mg by mouth daily   amphetamine-dextroamphetamine (ADDERALL XR) 20 MG per 24 hr capsule Take 20 mg by mouth daily   ALPRAZOLAM PO Take 1 mg by mouth as needed for anxiety   aspirin EC 81 MG EC tablet Take 81 mg by mouth daily   polyethylene glycol (MIRALAX/GLYCOLAX) powder Take 17 g by mouth daily   acetaminophen (TYLENOL) 500 MG tablet Take 500-1,000 mg by mouth every 6 hours as needed for mild pain   atenolol (TENORMIN) 25 MG tablet Take 25 mg by mouth daily.   hydrochlorothiazide (HYDRODIURIL) 25 MG tablet Take 50 mg by mouth daily      No current facility-administered medications on file prior to visit.   Allergies   Allergen Reactions     Wellbutrin [Bupropion Hcl]      Cephalexin Rash     Citalopram Rash     Fluoxetine Rash     Immunization History   Administered Date(s) Administered     TDAP Vaccine (Boostrix) 2013       Obstetric History       " "T0      L0     SAB0   TAB0   Ectopic0   Multiple0   Live Births0      No past medical history on file.  No past surgical history on file.  No family history on file.  Social History     Social History     Marital status:      Spouse name: N/A     Number of children: N/A     Years of education: N/A     Social History Main Topics     Smoking status: Never Smoker     Smokeless tobacco: Never Used     Alcohol use 0.6 - 1.2 oz/week     1 - 2 Standard drinks or equivalent per week     Drug use: No     Sexual activity: Not Currently     Other Topics Concern     None     Social History Narrative    How much exercise per week? none    How much calcium per day? supplement       How much caffeine per day? \"a lot\" of soda    How much vitamin D per day? supplement    Do you/your family wear seatbelts?  Yes    Do you/your family use safety helmets? n/a    Do you/your family use sunscreen? Not in sun    Do you/your family keep firearms in the home? No    Do you/your family have a smoke detector(s)? Yes            2017 Rohan Matta LPN               ROS  [unfilled]  No flowsheet data found.  No flowsheet data found.      EXAM:  Blood pressure 125/74, pulse 88, height 1.689 m (5' 6.5\"), weight 119.5 kg (263 lb 8 oz). Body mass index is 41.89 kg/(m^2).  Constitutional: Distressed woman, alternately crying and laughing, poor historian  Oriented to time and place, and interactive   Psychological: Appropriate mood given major depressive/anxiety disorder  Eyes symmetrical, sclera clear and white, conjunctiva pink and moist   Mouth: moist mucous membranes, no visible dental caries   Skin: warm and dry, no visible rashes or lesions  Neuro: normal gait, no visible tremor   Musculoskeletal: Full ROM, no muscular weakness visible.   45 minutes was spent in direct contact with patient and > 50% of the time in patient education and coordination of care.       ASSESSMENT:  Encounter Diagnosis   Name Primary?     " Symptomatic menopausal or female climacteric states Yes        PLAN:     Orders Placed This Encounter   Medications     Cholecalciferol (VITAMIN D3) 2000 UNITS CAPS     Sig: Take 2,000 Units by mouth     melatonin 3 MG tablet     Sig: Take 3 mg by mouth     triamcinolone (KENALOG) 0.1 % cream     Sig: Use on affected areas 1-2 times daily as needed for itching     DISCONTD: aspirin EC 81 MG EC tablet     Sig: Take 81 mg by mouth     atorvastatin (LIPITOR) 40 MG tablet     Sig: Take 40 mg by mouth daily       Reviewed pros and cons of HT. No clear indications for estrogen use to improve mood or excessive sweating  Explained HT not recommended post surgical menopause beyond 50-51 years of age  Encouraged [facility] program work for depression and weight loss, continue with psychiatrist  Patient agrees with plan.  Midlife Folder on integrative therapies given to patient and discussed    Zahraa GARCIA

## 2017-09-07 NOTE — LETTER
"2017     RE: Jacquie Giang  [address]  [place] [address]           Progress Note    SUBJECTIVE:  Jacquie Giang is an 57 year old, , here today to discuss  hormones therapy 28 years post surgical menopause  PCP at [facility]    Concerns today include:   1. Depression; Sees psychiatrist, has 40 year history of depression. Feels medications aren't working well,starting a 12 week outpatient program  2. Post menopause; age 39 [employer], started on HT after surgery for a few years.. Complains of heavy sweating \"hot all the time\",denies hot flashes. 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  Lives with 84 yo Mom  Exercise: none  Not sexually active    HISTORY:    Current Outpatient Prescriptions on File Prior to Visit:  LEVOTHYROXINE SODIUM PO Take 150 mcg by mouth daily    sertraline (ZOLOFT) 100 MG tablet Take 100 mg by mouth daily   amphetamine-dextroamphetamine (ADDERALL XR) 20 MG per 24 hr capsule Take 20 mg by mouth daily   ALPRAZOLAM PO Take 1 mg by mouth as needed for anxiety   aspirin EC 81 MG EC tablet Take 81 mg by mouth daily   polyethylene glycol (MIRALAX/GLYCOLAX) powder Take 17 g by mouth daily   acetaminophen (TYLENOL) 500 MG tablet Take 500-1,000 mg by mouth every 6 hours as needed for mild pain   atenolol (TENORMIN) 25 MG tablet Take 25 mg by mouth daily.   hydrochlorothiazide (HYDRODIURIL) 25 MG tablet Take 50 mg by mouth daily      No current facility-administered medications on file prior to visit.   Allergies   Allergen Reactions     Wellbutrin [Bupropion Hcl]      Cephalexin Rash     Citalopram Rash     Fluoxetine Rash     Immunization History   Administered Date(s) Administered     TDAP Vaccine (Boostrix) 2013       Obstetric History       " "T0      L0     SAB0   TAB0   Ectopic0   Multiple0   Live Births0      No past medical history on file.  No past surgical history on file.  No family history on file.  Social History     Social History     Marital status:      Spouse name: N/A     Number of children: N/A     Years of education: N/A     Social History Main Topics     Smoking status: Never Smoker     Smokeless tobacco: Never Used     Alcohol use 0.6 - 1.2 oz/week     1 - 2 Standard drinks or equivalent per week     Drug use: No     Sexual activity: Not Currently     Other Topics Concern     None     Social History Narrative    How much exercise per week? none    How much calcium per day? supplement       How much caffeine per day? \"a lot\" of soda    How much vitamin D per day? supplement    Do you/your family wear seatbelts?  Yes    Do you/your family use safety helmets? n/a    Do you/your family use sunscreen? Not in sun    Do you/your family keep firearms in the home? No    Do you/your family have a smoke detector(s)? Yes            2017 Rohan Matta LPN               ROS  [unfilled]  No flowsheet data found.  No flowsheet data found.      EXAM:  Blood pressure 125/74, pulse 88, height 1.689 m (5' 6.5\"), weight 119.5 kg (263 lb 8 oz). Body mass index is 41.89 kg/(m^2).  Constitutional: Distressed woman, alternately crying and laughing, poor historian  Oriented to time and place, and interactive   Psychological: Appropriate mood given major depressive/anxiety disorder  Eyes symmetrical, sclera clear and white, conjunctiva pink and moist   Mouth: moist mucous membranes, no visible dental caries   Skin: warm and dry, no visible rashes or lesions  Neuro: normal gait, no visible tremor   Musculoskeletal: Full ROM, no muscular weakness visible.   45 minutes was spent in direct contact with patient and > 50% of the time in patient education and coordination of care.       ASSESSMENT:  Encounter Diagnosis   Name Primary?     " Symptomatic menopausal or female climacteric states Yes        PLAN:     Orders Placed This Encounter   Medications     Cholecalciferol (VITAMIN D3) 2000 UNITS CAPS     Sig: Take 2,000 Units by mouth     melatonin 3 MG tablet     Sig: Take 3 mg by mouth     triamcinolone (KENALOG) 0.1 % cream     Sig: Use on affected areas 1-2 times daily as needed for itching     DISCONTD: aspirin EC 81 MG EC tablet     Sig: Take 81 mg by mouth     atorvastatin (LIPITOR) 40 MG tablet     Sig: Take 40 mg by mouth daily       Reviewed pros and cons of HT. No clear indications for estrogen use to improve mood or excessive sweating  Explained HT not recommended post surgical menopause beyond 50-51 years of age  Encouraged [facility] program work for depression and weight loss, continue with psychiatrist  Patient agrees with plan.  Midlife Folder on integrative therapies given to patient and discussed    Zahraa GARCIA

## 2017-09-07 NOTE — PROGRESS NOTES
"    Progress Note    SUBJECTIVE:  Jacquie Giang is an 57 year old, , here today to discuss  hormones therapy 28 years post surgical menopause  PCP at [facility]    Concerns today include:   1. Depression; Sees psychiatrist, has 40 year history of depression. Feels medications aren't working well,starting a 12 week outpatient program  2. Post menopause; age 39 [employer], started on HT after surgery for a few years.. Complains of heavy sweating \"hot all the time\",denies hot flashes. 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  Lives with 84 yo Mom  Exercise: none  Not sexually active    HISTORY:    Current Outpatient Prescriptions on File Prior to Visit:  LEVOTHYROXINE SODIUM PO Take 150 mcg by mouth daily    sertraline (ZOLOFT) 100 MG tablet Take 100 mg by mouth daily   amphetamine-dextroamphetamine (ADDERALL XR) 20 MG per 24 hr capsule Take 20 mg by mouth daily   ALPRAZOLAM PO Take 1 mg by mouth as needed for anxiety   aspirin EC 81 MG EC tablet Take 81 mg by mouth daily   polyethylene glycol (MIRALAX/GLYCOLAX) powder Take 17 g by mouth daily   acetaminophen (TYLENOL) 500 MG tablet Take 500-1,000 mg by mouth every 6 hours as needed for mild pain   atenolol (TENORMIN) 25 MG tablet Take 25 mg by mouth daily.   hydrochlorothiazide (HYDRODIURIL) 25 MG tablet Take 50 mg by mouth daily      No current facility-administered medications on file prior to visit.   Allergies   Allergen Reactions     Wellbutrin [Bupropion Hcl]      Cephalexin Rash     Citalopram Rash     Fluoxetine Rash     Immunization History   Administered Date(s) Administered     TDAP Vaccine (Boostrix) 2013       Obstetric History       T0      L0     SAB0   TAB0   Ectopic0   Multiple0   Live Births0      No past " "medical history on file.  No past surgical history on file.  No family history on file.  Social History     Social History     Marital status:      Spouse name: N/A     Number of children: N/A     Years of education: N/A     Social History Main Topics     Smoking status: Never Smoker     Smokeless tobacco: Never Used     Alcohol use 0.6 - 1.2 oz/week     1 - 2 Standard drinks or equivalent per week     Drug use: No     Sexual activity: Not Currently     Other Topics Concern     None     Social History Narrative    How much exercise per week? none    How much calcium per day? supplement       How much caffeine per day? \"a lot\" of soda    How much vitamin D per day? supplement    Do you/your family wear seatbelts?  Yes    Do you/your family use safety helmets? n/a    Do you/your family use sunscreen? Not in sun    Do you/your family keep firearms in the home? No    Do you/your family have a smoke detector(s)? Yes            September 7, 2017 Rohan Matta LPN               ROS  [unfilled]  No flowsheet data found.  No flowsheet data found.      EXAM:  Blood pressure 125/74, pulse 88, height 1.689 m (5' 6.5\"), weight 119.5 kg (263 lb 8 oz). Body mass index is 41.89 kg/(m^2).  Constitutional: Distressed woman, alternately crying and laughing, poor historian  Oriented to time and place, and interactive   Psychological: Appropriate mood given major depressive/anxiety disorder  Eyes symmetrical, sclera clear and white, conjunctiva pink and moist   Mouth: moist mucous membranes, no visible dental caries   Skin: warm and dry, no visible rashes or lesions  Neuro: normal gait, no visible tremor   Musculoskeletal: Full ROM, no muscular weakness visible.   45 minutes was spent in direct contact with patient and > 50% of the time in patient education and coordination of care.       ASSESSMENT:  Encounter Diagnosis   Name Primary?     Symptomatic menopausal or female climacteric states Yes        PLAN:     Orders Placed This " Encounter   Medications     Cholecalciferol (VITAMIN D3) 2000 UNITS CAPS     Sig: Take 2,000 Units by mouth     melatonin 3 MG tablet     Sig: Take 3 mg by mouth     triamcinolone (KENALOG) 0.1 % cream     Sig: Use on affected areas 1-2 times daily as needed for itching     DISCONTD: aspirin EC 81 MG EC tablet     Sig: Take 81 mg by mouth     atorvastatin (LIPITOR) 40 MG tablet     Sig: Take 40 mg by mouth daily       Reviewed pros and cons of HT. No clear indications for estrogen use to improve mood or excessive sweating  Explained HT not recommended post surgical menopause beyond 50-51 years of age  Encouraged [facility] program work for depression and weight loss, continue with psychiatrist  Patient agrees with plan.  Midlife Folder on integrative therapies given to patient and discussed    Zahraa GARCIA

## 2017-09-11 ENCOUNTER — OFFICE VISIT (OUTPATIENT)
Dept: SLEEP MEDICINE | Facility: CLINIC | Age: 57
End: 2017-09-11
Payer: COMMERCIAL

## 2017-09-11 VITALS
BODY MASS INDEX: 42.27 KG/M2 | SYSTOLIC BLOOD PRESSURE: 137 MMHG | OXYGEN SATURATION: 97 % | DIASTOLIC BLOOD PRESSURE: 85 MMHG | WEIGHT: 263 LBS | HEART RATE: 76 BPM | HEIGHT: 66 IN

## 2017-09-11 DIAGNOSIS — G47.33 OSA (OBSTRUCTIVE SLEEP APNEA): Primary | ICD-10-CM

## 2017-09-11 PROCEDURE — 99213 OFFICE O/P EST LOW 20 MIN: CPT | Performed by: PSYCHIATRY & NEUROLOGY

## 2017-09-11 NOTE — MR AVS SNAPSHOT
After Visit Summary   9/11/2017    Jacquie Giang    MRN: [id number]           Patient Information     Date Of Birth          1960        Visit Information        Provider Department      9/11/2017 1:30 PM Kodi Bentley MD [facility] [place]        Today's Diagnoses     ASHWIN (obstructive sleep apnea)    -  1      Care Instructions      Your BMI is Body mass index is 41.82 kg/(m^2).  Weight management is a personal decision.  If you are interested in exploring weight loss strategies, the following discussion covers the approaches that may be successful. Body mass index (BMI) is one way to tell whether you are at a healthy weight, overweight, or obese. It measures your weight in relation to your height.  A BMI of 18.5 to 24.9 is in the healthy range. A person with a BMI of 25 to 29.9 is considered overweight, and someone with a BMI of 30 or greater is considered obese. More than two-thirds of American adults are considered overweight or obese.  Being overweight or obese increases the risk for further weight gain. Excess weight may lead to heart disease and diabetes.  Creating and following plans for healthy eating and physical activity may help you improve your health.  Weight control is part of healthy lifestyle and includes exercise, emotional health, and healthy eating habits. Careful eating habits lifelong are the mainstay of weight control. Though there are significant health benefits from weight loss, long-term weight loss with diet alone may be very difficult to achieve- studies show long-term success with dietary management in less than 10% of people. Attaining a healthy weight may be especially difficult to achieve in those with severe obesity. In some cases, medications, devices and surgical management might be considered.  What can you do?  If you are overweight or obese and are interested in methods for weight loss, you should discuss this with your provider.      Consider reducing daily calorie intake by 500 calories.     Keep a food journal.     Avoiding skipping meals, consider cutting portions instead.    Diet combined with exercise helps maintain muscle while optimizing fat loss. Strength training is particularly important for building and maintaining muscle mass. Exercise helps reduce stress, increase energy, and improves fitness. Increasing exercise without diet control, however, may not burn enough calories to loose weight.       Start walking three days a week 10-20 minutes at a time    Work towards walking thirty minutes five days a week     Eventually, increase the speed of your walking for 1-2 minutes at time    In addition, we recommend that you review healthy lifestyles and methods for weight loss available through the National Institutes of Health patient information sites:  http://win.niddk.nih.gov/publications/index.htm    And look into health and wellness programs that may be available through your health insurance provider, employer, local community center, or blake club.    Weight management plan: Patient was referred to their PCP to discuss a diet and exercise plan.              Follow-ups after your visit        Additional Services     SLEEP DENTAL REFERRAL       Dental appliance resources recommended by [facility]     Below is a list of dental appliance resources recommended by [facility]   If you wish to choose your own dental sleep dentist, you may identify a provider close to you: [email or web address]    [facility]   Sleep Medicine [facility]   Jack Yi DDS, MS   [place] [address]   Appointments: [phone]  Fax: [phone]   Email: [email or web address]   Dental [facility] Oral Surgery Clinic   Hayes Alfonso, LAILA Kiran DDS   [address]    [address]   [place] [address]   Appointments: [phone]   Website: [facility] [email or web address]/oms/[facility]_[email or web address]_193    Snoring and Sleep Apnea   [facility]   DERIC Kiser DDS  [address], [address]   [place] [address]   Appointments: [phone]   Fax: [phone]   Email: [email or web address]  Website: [employer]     [facility]   Office 1   Prudencio Granger DDS - [DME Medicare]  [address], [address]   [place] [address]  Appointments: [phone]  Fax: [phone]  Website: [employer]    [facility]   Office 2  Prudencio Granger DDS - [DME Medicare]  [address]  [place] [address]  Appointments: [phone]  Fax: [phone]  Website: [employer]    [facility]  Tristin Rodriguez DDS  [address] [place] [address]  Appointments: [phone]    [facility]   [place] Office   José Luis Kiran DDS   [facility]   [address]   [place] [address]   Appointments: [phone]   Fax: [phone]   Website: AT Internet     [facility]   [place] Office   Tavon Stokes DDS, MS - [DME Medicare]  [facility]   [address]   [place] [address]   Appointments: [phone]   Fax: [phone]   Website: AT Internet     Patrice Your Christie Carbajal DMD, MSD - [DME Medicare]  [address]  [place] [address]  Appointments [phone]  Fax: [phone]  Website: [employer]    [facility]   [place] Office   Lety Mobley DDS, PhD, MS   [facility]   [address] [place] [address], [address]   [place] [address]   Appointments: [phone]   Fax: [phone]   Website: [facility]facialSt[facility].[employer]st Parameter     [facility]   [place] Office   Lety Mobley,  DERIC, PhD, MS   [facility]   [address]   [place] [address]   Appointments: [phone]   Fax: [phone]   Website: [facility].YouWeb     [facility]  Rosi Palumbo DDS  [facility]  [address] [place] [address]  Appointments: [phone]   Fax: [phone]    If you wish to choose your own dental sleep dentist, you may identify a provider close to you: [email or web address]      AHI: 14.4 PSG DATE: 8-25-17     Other information regarding this referral: Mandibular repositioning appliance for ASHWIN. If your insurance asks for a CPT code, it is .    Please be aware that coverage of these services is subject to the terms and limitations of your health insurance plan.  Call member services at your health plan with any benefit or coverage questions.      Please bring the following to your appointment:    >>   List of current medications   >>   This referral request   >>   Any documents/labs given to you for this referral                  Who to contact     If you have questions or need follow up information about today's clinic visit or your schedule please contact [facility] directly at [phone].  Normal or non-critical lab and imaging results will be communicated to you by [employer], letter or phone within 4 business days after the clinic has received the results. If you do not hear from us within 7 days, please contact the clinic through [employer] or phone. If you have a critical or abnormal lab result, we will notify you by phone as soon as possible.  Submit refill requests through [facility] or call your pharmacy and they will forward the refill request to us. Please allow 3 business days for your refill to be completed.          Additional Information About Your Visit        [facility] Information     [facility] lets you send messages to your doctor, view your test results, renew  "your prescriptions, schedule appointments and more. To sign up, go to [email or web address] . Click on \"Log in\" on the left side of the screen, which will take you to the Welcome page. Then click on \"Sign up Now\" on the right side of the page.     You will be asked to enter the access code listed below, as well as some personal information. Please follow the directions to create your username and password.     Your access code is: [email or web address]  Expires: 10/22/2017  5:09 PM     Your access code will  in 90 days. If you need help or a new code, please call your [facility] clinic or [phone].        Care EveryWhere ID     This is your Care EveryWhere ID. This could be used by other organizations to access your [facility] medical records  [id number]        Your Vitals Were     Pulse Height Pulse Oximetry BMI (Body Mass Index)          76 1.689 m (5' 6.5\") 97% 41.82 kg/m2         Blood Pressure from Last 3 Encounters:   17 137/85   17 125/74   17 134/82    Weight from Last 3 Encounters:   17 119.3 kg (263 lb)   17 119.5 kg (263 lb 8 oz)   17 129.7 kg (286 lb)              We Performed the Following     SLEEP DENTAL REFERRAL          Today's Medication Changes          These changes are accurate as of: 17  1:59 PM.  If you have any questions, ask your nurse or doctor.               Stop taking these medicines if you haven't already. Please contact your care team if you have questions.     triamcinolone 0.1 % cream   Commonly known as:  KENALOG           TYLENOL 500 MG tablet   Generic drug:  acetaminophen                    Primary Care Provider Office Phone # Fax #    Lakshmi Kaur [phone]       [facility] [address] [facility] [address]        Equal Access to Services     [facility] TINO AH: Guadalupe traylor Soradha, waaxda luqadaha, qaybta agnieszkaalrussell [place]. So [facility] " [phone].    ATENCIÓN: Si roselyn villegas, tiene a guillen disposición servicios gratuitos de asistencia lingüística. Danay kendall [phone].    We comply with applicable federal civil rights laws and [place] laws. We do not discriminate on the basis of race, color, national origin, age, disability sex, sexual orientation or gender identity.            Thank you!     Thank you for choosing [facility]  for your care. Our goal is always to provide you with excellent care. Hearing back from our patients is one way we can continue to improve our services. Please take a few minutes to complete the written survey that you may receive in the mail after your visit with us. Thank you!             Your Updated Medication List - Protect others around you: Learn how to safely use, store and throw away your medicines at www.disposemymeds.org.          This list is accurate as of: 9/11/17  1:59 PM.  Always use your most recent med list.                   Brand Name Dispense Instructions for use Diagnosis    ALPRAZOLAM PO      Take 1 mg by mouth as needed for anxiety        amphetamine-dextroamphetamine 20 MG per 24 hr capsule    ADDERALL XR     Take 20 mg by mouth daily        aspirin EC 81 MG EC tablet      Take 81 mg by mouth daily        atenolol 25 MG tablet    TENORMIN     Take 25 mg by mouth daily.        atorvastatin 40 MG tablet    LIPITOR     Take 40 mg by mouth daily        hydrochlorothiazide 25 MG tablet    HYDRODIURIL     Take 50 mg by mouth daily        LEVOTHYROXINE SODIUM PO      Take 150 mcg by mouth daily        melatonin 3 MG tablet      Take 3 mg by mouth        polyethylene glycol powder    MIRALAX/GLYCOLAX     Take 17 g by mouth daily        sertraline 100 MG tablet    ZOLOFT     Take 100 mg by mouth daily        vitamin D3 2000 UNITS Caps      Take 2,000 Units by mouth

## 2017-09-11 NOTE — PATIENT INSTRUCTIONS

## 2017-09-11 NOTE — NURSING NOTE
"Chief Complaint   Patient presents with     Study Results     Follow up psg        Initial /85  Pulse 76  Ht 1.689 m (5' 6.5\")  Wt 119.3 kg (263 lb)  SpO2 97%  BMI 41.82 kg/m2 Estimated body mass index is 41.82 kg/(m^2) as calculated from the following:    Height as of this encounter: 1.689 m (5' 6.5\").    Weight as of this encounter: 119.3 kg (263 lb).  Medication Reconciliation: complete   ESS 6  Yanique Mason MA      "

## 2017-09-12 NOTE — PROGRESS NOTES
FOLLOW UP NOTE      Jacquie Blake is a 57-year-old female with challenging mood disorder, depression and daytime sleepiness who returns to see us after her overnight polysomnogram.  She has completed her transcranial magnetic stimulation therapy with Dr. Borrero for now and unfortunately has not noticed a significant improvement in regards to mood or sleepiness.  Of note, her overnight polysomnogram did demonstrate moderate sleep-disordered breathing with an apnea-hypopnea index of 14.4 events an hour.  These were best characterized as obstructive events and we discussed treatment options including continuous positive airway pressure, which she has tried before in the past and not done well and dental appliance.  Considering that she indicated she still has all of her dentition, I think the dental referral would be an excellent option.  I put a referral in for a dental treatment of obstructive sleep apnea.  She will continue to see her colleagues in mental health and she will follow up with me in about 6-8 weeks.      In the meanwhile. it is critically important she not operate a motor vehicle if she is tired or sleepy.  I wish her the best.      Fifteen minutes were spent with the patient today, greater than 50% of the time in counseling and coordination of care.         JOEY FULTON MD             D: 2017 14:04   T: 2017 22:45   MT: CRESENCIO      Name:     JACQUIE BLAKE   MRN:      [id number]        Account:      [id number]   :      1960           Visit Date:   2017      Document: [id number]

## 2017-10-02 ENCOUNTER — TRANSFERRED RECORDS (OUTPATIENT)
Dept: HEALTH INFORMATION MANAGEMENT | Facility: CLINIC | Age: 57
End: 2017-10-02

## 2017-11-28 ENCOUNTER — TELEPHONE (OUTPATIENT)
Dept: SLEEP MEDICINE | Facility: CLINIC | Age: 57
End: 2017-11-28

## 2017-11-29 NOTE — TELEPHONE ENCOUNTER
Reason for Call:  Other     Detailed comments: Patient requesting for Jose Quinteros () to call patient back in regards to pillow case situation. Please advise.     Phone Number Patient can be reached at: Home number on file [phone] (home)    Best Time: n/a    Can we leave a detailed message on this number? Not Applicable    Call taken on 11/28/2017 at 6:44 PM by Traci Felix

## 2018-02-19 ENCOUNTER — TRANSFERRED RECORDS (OUTPATIENT)
Dept: HEALTH INFORMATION MANAGEMENT | Facility: CLINIC | Age: 58
End: 2018-02-19

## 2018-04-16 ENCOUNTER — OFFICE VISIT (OUTPATIENT)
Dept: SLEEP MEDICINE | Facility: CLINIC | Age: 58
End: 2018-04-16
Payer: COMMERCIAL

## 2018-04-16 VITALS
HEART RATE: 70 BPM | SYSTOLIC BLOOD PRESSURE: 135 MMHG | DIASTOLIC BLOOD PRESSURE: 87 MMHG | BODY MASS INDEX: 41.3 KG/M2 | OXYGEN SATURATION: 93 % | WEIGHT: 257 LBS | HEIGHT: 66 IN

## 2018-04-16 DIAGNOSIS — G47.33 OSA (OBSTRUCTIVE SLEEP APNEA): Primary | ICD-10-CM

## 2018-04-16 PROCEDURE — 99213 OFFICE O/P EST LOW 20 MIN: CPT | Performed by: PSYCHIATRY & NEUROLOGY

## 2018-04-16 NOTE — PROGRESS NOTES
Visit Date:   04/16/2018      FOLLOWUP NOTE      Ms. Jacquie Giang is 57 years old.  She has a history of excessive daytime sleepiness with severe mood disorder.  She was noted on a polysomnogram to have mild obstructive sleep apnea; however, I was suspicious that she actually had more significant apnea than that based on the nature of her study and because of this, we did arrange for her to have a dental appliance therapy.  She is using it the vast majority nights.  She indicates that she does like using it.  Her sleep is more refreshing.  She just needs to use it more consistently.  In addition to that, she feels that her mood and sleepiness has improved substantially based on some changes that she has been making with her psychiatrist, Dr. Luis Borrero.  I encouraged her to continue to make changes in that regard.      We had a discussion today regarding whether or not it is necessary to pursue objective testing of her dental appliance.  At this point, we are going to hold off on making any particular changes.  She is going to continue to work with Dr. Borrero because there may be some changes upcoming.  She has pursued neuromodulation therapy before in the past and that may be considered in the future.  Once she has completed her evaluation and management with Dr. Borrero, if she is still excessively sleepy, we will arrange for her to have a followup polysomnogram with dental appliance titration.  We will advance her device if necessary.  If it is adequately treating her sleep disordered breathing despite her symptoms, we will consider stimulant therapy such as modafinil.      The patient understands the plan.  It is a great privilege being asked to participate in her care.  She is insightful and understands the importance of never operating a motor vehicle while tired or sleepy.      Fifteen minutes were spent with the patient today, greater than 50% of the time in counseling and coordination of care.          JOEY FULTON MD             D: 2018   T: 2018   MT: SUZE      Name:     MATTY BLAKE   MRN:      [id number]        Account:      [id number]   :      1960           Visit Date:   2018      Document: [id number]

## 2018-04-16 NOTE — MR AVS SNAPSHOT
After Visit Summary   4/16/2018    Jacquie Giang    MRN: [id number]           Patient Information     Date Of Birth          1960        Visit Information        Provider Department      4/16/2018 1:30 PM Kodi Bentley MD [facility]        Today's Diagnoses     ASHWIN (obstructive sleep apnea)    -  1      Care Instructions      Your BMI is Body mass index is 40.86 kg/(m^2).  Weight management is a personal decision.  If you are interested in exploring weight loss strategies, the following discussion covers the approaches that may be successful. Body mass index (BMI) is one way to tell whether you are at a healthy weight, overweight, or obese. It measures your weight in relation to your height.  A BMI of 18.5 to 24.9 is in the healthy range. A person with a BMI of 25 to 29.9 is considered overweight, and someone with a BMI of 30 or greater is considered obese. More than two-thirds of American adults are considered overweight or obese.  Being overweight or obese increases the risk for further weight gain. Excess weight may lead to heart disease and diabetes.  Creating and following plans for healthy eating and physical activity may help you improve your health.  Weight control is part of healthy lifestyle and includes exercise, emotional health, and healthy eating habits. Careful eating habits lifelong are the mainstay of weight control. Though there are significant health benefits from weight loss, long-term weight loss with diet alone may be very difficult to achieve- studies show long-term success with dietary management in less than 10% of people. Attaining a healthy weight may be especially difficult to achieve in those with severe obesity. In some cases, medications, devices and surgical management might be considered.  What can you do?  If you are overweight or obese and are interested in methods for weight loss, you should discuss this with your provider.      Consider reducing daily calorie intake by 500 calories.     Keep a food journal.     Avoiding skipping meals, consider cutting portions instead.    Diet combined with exercise helps maintain muscle while optimizing fat loss. Strength training is particularly important for building and maintaining muscle mass. Exercise helps reduce stress, increase energy, and improves fitness. Increasing exercise without diet control, however, may not burn enough calories to loose weight.       Start walking three days a week 10-20 minutes at a time    Work towards walking thirty minutes five days a week     Eventually, increase the speed of your walking for 1-2 minutes at time    In addition, we recommend that you review healthy lifestyles and methods for weight loss available through the National Institutes of Health patient information sites:  http://win.niddk.nih.gov/publications/index.htm    And look into health and wellness programs that may be available through your health insurance provider, employer, local community center, or [employer].    Weight management plan: Patient was referred to their PCP to discuss a diet and exercise plan.              Follow-ups after your visit        Your next 10 appointments already scheduled     Apr 16, 2018  1:30 PM CDT   Return Sleep Patient with Kodi Bentley MD   [facility] ([facility])    [address] [address] [phone]              Who to contact     If you have questions or need follow up information about today's clinic visit or your schedule please contact [facility] directly at [phone].  Normal or non-critical lab and imaging results will be communicated to you by MyChart, letter or phone within 4 business days after the clinic has received the results. If you do not hear from us within 7 days, please contact the clinic through MyChart or phone. If you have a critical  "or abnormal lab result, we will notify you by phone as soon as possible.  Submit refill requests through [employer] or call your pharmacy and they will forward the refill request to us. Please allow 3 business days for your refill to be completed.          Additional Information About Your Visit        Z[employer]le[employer] Information     [employer] lets you send messages to your doctor, view your test results, renew your prescriptions, schedule appointments and more. To sign up, go to [email or web address] . Click on \"Log in\" on the left side of the screen, which will take you to the Welcome page. Then click on \"Sign up Now\" on the right side of the page.     You will be asked to enter the access code listed below, as well as some personal information. Please follow the directions to create your username and password.     Your access code is: [email or web address]  Expires: 7/15/2018  1:25 PM     Your access code will  in 90 days. If you need help or a new code, please call your [facility] clinic or [phone].        Care EveryWhere ID     This is your Care EveryWhere ID. This could be used by other organizations to access your [facility] medical records  [id number]        Your Vitals Were     Pulse Height Pulse Oximetry BMI (Body Mass Index)          70 1.689 m (5' 6.5\") 93% 40.86 kg/m2         Blood Pressure from Last 3 Encounters:   18 135/87   17 137/85   17 125/74    Weight from Last 3 Encounters:   18 116.6 kg (257 lb)   17 119.3 kg (263 lb)   17 119.5 kg (263 lb 8 oz)              Today, you had the following     No orders found for display       Primary Care Provider Office Phone # Fax #    Lakshmi BRAEDEN Kaur [phone]       [facility] [address]  [facility] [address]        Equal Access to Services     ADOLPH JIMÉNEZ AH: Guadalupe Garcia, abdullahi logan, qarussell lai'aan ah. So [facility] " [phone].    ATENCIÓN: Si roselyn villegas, tiene a guillen disposición servicios gratuitos de asistencia lingüística. Danay kendall [phone].    We comply with applicable federal civil rights laws and [place] laws. We do not discriminate on the basis of race, color, national origin, age, disability, sex, sexual orientation, or gender identity.            Thank you!     Thank you for choosing [facility]  for your care. Our goal is always to provide you with excellent care. Hearing back from our patients is one way we can continue to improve our services. Please take a few minutes to complete the written survey that you may receive in the mail after your visit with us. Thank you!             Your Updated Medication List - Protect others around you: Learn how to safely use, store and throw away your medicines at www.disposemymeds.org.          This list is accurate as of 4/16/18  1:25 PM.  Always use your most recent med list.                   Brand Name Dispense Instructions for use Diagnosis    ALPRAZOLAM PO      Take 1 mg by mouth as needed for anxiety        amphetamine-dextroamphetamine 20 MG per 24 hr capsule    ADDERALL XR     Take 20 mg by mouth daily        aspirin EC 81 MG EC tablet      Take 81 mg by mouth daily        atenolol 25 MG tablet    TENORMIN     Take 25 mg by mouth daily.        atorvastatin 40 MG tablet    LIPITOR     Take 40 mg by mouth daily        hydrochlorothiazide 25 MG tablet    HYDRODIURIL     Take 50 mg by mouth daily        LEVOTHYROXINE SODIUM PO      Take 150 mcg by mouth daily        melatonin 3 MG tablet      Take 3 mg by mouth        polyethylene glycol powder    MIRALAX/GLYCOLAX     Take 17 g by mouth daily        sertraline 100 MG tablet    ZOLOFT     Take 100 mg by mouth daily        vitamin D3 2000 units Caps      Take 2,000 Units by mouth

## 2018-04-16 NOTE — NURSING NOTE
"Chief Complaint   Patient presents with     Sleep Problem     Follow up tc       Initial /87  Pulse 70  Ht 1.689 m (5' 6.5\")  Wt 116.6 kg (257 lb)  SpO2 93%  BMI 40.86 kg/m2 Estimated body mass index is 40.86 kg/(m^2) as calculated from the following:    Height as of this encounter: 1.689 m (5' 6.5\").    Weight as of this encounter: 116.6 kg (257 lb).  Medication Reconciliation: complete     ESS 0  Yanique Mason MA      "

## 2018-04-16 NOTE — PATIENT INSTRUCTIONS

## 2018-12-20 ENCOUNTER — OFFICE VISIT (OUTPATIENT)
Dept: SLEEP MEDICINE | Facility: CLINIC | Age: 58
End: 2018-12-20
Payer: COMMERCIAL

## 2018-12-20 VITALS
HEIGHT: 67 IN | HEART RATE: 74 BPM | DIASTOLIC BLOOD PRESSURE: 78 MMHG | SYSTOLIC BLOOD PRESSURE: 120 MMHG | RESPIRATION RATE: 16 BRPM | BODY MASS INDEX: 35.82 KG/M2 | OXYGEN SATURATION: 96 % | WEIGHT: 228.2 LBS

## 2018-12-20 DIAGNOSIS — G47.33 OSA (OBSTRUCTIVE SLEEP APNEA): Primary | ICD-10-CM

## 2018-12-20 PROCEDURE — 99213 OFFICE O/P EST LOW 20 MIN: CPT | Performed by: PSYCHIATRY & NEUROLOGY

## 2018-12-20 ASSESSMENT — MIFFLIN-ST. JEOR: SCORE: 1634.8

## 2018-12-20 NOTE — NURSING NOTE
"Chief Complaint   Patient presents with     Sleep Problem     Medication f/u       Initial /78   Pulse 74   Resp 16   Ht 1.689 m (5' 6.5\")   Wt 103.5 kg (228 lb 3.2 oz)   SpO2 96%   BMI 36.28 kg/m   Estimated body mass index is 36.28 kg/m  as calculated from the following:    Height as of this encounter: 1.689 m (5' 6.5\").    Weight as of this encounter: 103.5 kg (228 lb 3.2 oz).    Medication Reconciliation: complete     ESS 3  Saira Casanova        "

## 2018-12-20 NOTE — PATIENT INSTRUCTIONS
Your BMI is Body mass index is 36.28 kg/m .  Weight management is a personal decision.  If you are interested in exploring weight loss strategies, the following discussion covers the approaches that may be successful. Body mass index (BMI) is one way to tell whether you are at a healthy weight, overweight, or obese. It measures your weight in relation to your height.  A BMI of 18.5 to 24.9 is in the healthy range. A person with a BMI of 25 to 29.9 is considered overweight, and someone with a BMI of 30 or greater is considered obese. More than two-thirds of American adults are considered overweight or obese.  Being overweight or obese increases the risk for further weight gain. Excess weight may lead to heart disease and diabetes.  Creating and following plans for healthy eating and physical activity may help you improve your health.  Weight control is part of healthy lifestyle and includes exercise, emotional health, and healthy eating habits. Careful eating habits lifelong are the mainstay of weight control. Though there are significant health benefits from weight loss, long-term weight loss with diet alone may be very difficult to achieve- studies show long-term success with dietary management in less than 10% of people. Attaining a healthy weight may be especially difficult to achieve in those with severe obesity. In some cases, medications, devices and surgical management might be considered.  What can you do?  If you are overweight or obese and are interested in methods for weight loss, you should discuss this with your provider.     Consider reducing daily calorie intake by 500 calories.     Keep a food journal.     Avoiding skipping meals, consider cutting portions instead.    Diet combined with exercise helps maintain muscle while optimizing fat loss. Strength training is particularly important for building and maintaining muscle mass. Exercise helps reduce stress, increase energy, and improves fitness.  Increasing exercise without diet control, however, may not burn enough calories to loose weight.       Start walking three days a week 10-20 minutes at a time    Work towards walking thirty minutes five days a week     Eventually, increase the speed of your walking for 1-2 minutes at time    In addition, we recommend that you review healthy lifestyles and methods for weight loss available through the National Institutes of Health patient information sites:  http://win.niddk.nih.gov/publications/index.htm    And look into health and wellness programs that may be available through your health insurance provider, employer, local community center, or [employer].    Weight management plan: Patient was referred to their PCP to discuss a diet and exercise plan.

## 2018-12-24 NOTE — PROGRESS NOTES
Visit Date:   12/20/2018      FOLLOWUP NOTE      Ms. Giang is 58 years old.  The last time I saw her, we were dealing with mild obstructive sleep apnea, daytime sleepiness and severe depression.  Because of the severe depression, I had her visit with my colleagues upstairs at [facility] and I am very glad that she did.  Her mood is clearly in a better place than it was last time I saw her, although unfortunately transcranial magnetic stimulation did not appear to be helpful either for mood or for her sleepiness, but it looks like some of the other pharmacological strategies have helped.  Furthermore, she has a dental appliance and is working with Dr. Elkins to advance it properly.  We discussed possibly doing a repeat sleep study to confirm that her sleep disordered breathing is under effective control; however, we paused to contemplate what our options would be that she would be willing to consider depending upon the results of a sleep study, either in-lab study or a home sleep apnea test.  The patient indicated that she is not willing to consider again CPAP or upper airway surgery, thus the only other further option would be of further advancing her dental appliance and that may be indicated anyway.  We will have her visit with Dr. Kd lEkins in the near future to discuss that.  Once the device is fully advanced, if she still is excessively sleepy, we certainly can arrange for her to have a repeat study and she indicated that she would prefer a home sleep apnea test and I think that is reasonable.      She is already on a stimulant medication in particular, methylphenidate, which was recently started for attention problems.  Unfortunately, she is now having quite a bit of trouble falling asleep at night, sometimes 3 to 4 hours she is lying awake, trying to fall asleep.  Thus, I would suggest that she should not be on methylphenidate and should consider other agents for her attention.  One option that we have  considered would be modafinil, which she would be a candidate for because of sleepiness, resistant to treatment of her sleep disordered breathing; however, she is going to be following up with her psychiatrist the near future to further discuss her attention issues and I will defer to their management other than to indicate that I think she should probably come off of methylphenidate because of the new insomnia and she indicated that she would communicate this to them.  I am also happy to discuss it with her mental health providers.  Our phone number here is [phone].      All questions have been answered.  It is a great privilege being asked to participate in her care.  Fifteen minutes were spent on the patient today, greater than 50% of the time in counseling and coordination of care.  The patient has been advised to never operate a motor vehicle if tired or sleepy.         JOEY FULTON MD             D: 2018   T: 2018   MT: JOSLYN      Name:     MATTY BLAKE   MRN:      [id number]        Account:      [id number]   :      1960           Visit Date:   2018      Document: [id number]

## 2025-04-29 ENCOUNTER — TELEPHONE (OUTPATIENT)
Dept: SLEEP MEDICINE | Facility: CLINIC | Age: 65
End: 2025-04-29
Payer: MEDICARE

## 2025-07-02 ENCOUNTER — OFFICE VISIT (OUTPATIENT)
Dept: SLEEP MEDICINE | Facility: CLINIC | Age: 65
End: 2025-07-02
Payer: COMMERCIAL

## 2025-07-02 VITALS
HEART RATE: 84 BPM | BODY MASS INDEX: 33.52 KG/M2 | SYSTOLIC BLOOD PRESSURE: 106 MMHG | WEIGHT: 208.6 LBS | OXYGEN SATURATION: 94 % | RESPIRATION RATE: 12 BRPM | HEIGHT: 66 IN | DIASTOLIC BLOOD PRESSURE: 71 MMHG

## 2025-07-02 DIAGNOSIS — Z86.69 HISTORY OF OBSTRUCTIVE SLEEP APNEA: Primary | ICD-10-CM

## 2025-07-02 DIAGNOSIS — G47.00 PERSISTENT INSOMNIA: ICD-10-CM

## 2025-07-02 DIAGNOSIS — R53.82 CHRONIC FATIGUE: ICD-10-CM

## 2025-07-02 DIAGNOSIS — R06.83 SNORING: ICD-10-CM

## 2025-07-02 PROCEDURE — 1126F AMNT PAIN NOTED NONE PRSNT: CPT | Performed by: INTERNAL MEDICINE

## 2025-07-02 PROCEDURE — 3078F DIAST BP <80 MM HG: CPT | Performed by: INTERNAL MEDICINE

## 2025-07-02 PROCEDURE — 99204 OFFICE O/P NEW MOD 45 MIN: CPT | Performed by: INTERNAL MEDICINE

## 2025-07-02 PROCEDURE — 3074F SYST BP LT 130 MM HG: CPT | Performed by: INTERNAL MEDICINE

## 2025-07-02 RX ORDER — VENLAFAXINE HYDROCHLORIDE 75 MG/1
75 CAPSULE, EXTENDED RELEASE ORAL DAILY
COMMUNITY
Start: 2025-01-31

## 2025-07-02 RX ORDER — VENLAFAXINE HYDROCHLORIDE 150 MG/1
150 CAPSULE, EXTENDED RELEASE ORAL DAILY
COMMUNITY
Start: 2025-01-22

## 2025-07-02 RX ORDER — LEVOTHYROXINE SODIUM 137 UG/1
137 TABLET ORAL
COMMUNITY

## 2025-07-02 RX ORDER — SUVOREXANT 10 MG/1
10 TABLET, FILM COATED ORAL AT BEDTIME
COMMUNITY
Start: 2024-09-25

## 2025-07-02 RX ORDER — LEMBOREXANT 10 MG/1
10 TABLET, FILM COATED ORAL 3 TIMES DAILY PRN
COMMUNITY
Start: 2025-01-22

## 2025-07-02 RX ORDER — LISDEXAMFETAMINE DIMESYLATE 30 MG/1
30 CAPSULE ORAL EVERY MORNING
COMMUNITY
Start: 2025-07-01

## 2025-07-02 ASSESSMENT — SLEEP AND FATIGUE QUESTIONNAIRES
HOW LIKELY ARE YOU TO NOD OFF OR FALL ASLEEP WHEN YOU ARE A PASSENGER IN A CAR FOR AN HOUR WITHOUT A BREAK: WOULD NEVER DOZE
HOW LIKELY ARE YOU TO NOD OFF OR FALL ASLEEP IN A CAR, WHILE STOPPED FOR A FEW MINUTES IN TRAFFIC: WOULD NEVER DOZE
HOW LIKELY ARE YOU TO NOD OFF OR FALL ASLEEP WHILE SITTING QUIETLY AFTER LUNCH WITHOUT ALCOHOL: WOULD NEVER DOZE
HOW LIKELY ARE YOU TO NOD OFF OR FALL ASLEEP WHILE SITTING INACTIVE IN A PUBLIC PLACE: WOULD NEVER DOZE
HOW LIKELY ARE YOU TO NOD OFF OR FALL ASLEEP WHILE SITTING AND TALKING TO SOMEONE: WOULD NEVER DOZE
HOW LIKELY ARE YOU TO NOD OFF OR FALL ASLEEP WHILE SITTING AND READING: WOULD NEVER DOZE
HOW LIKELY ARE YOU TO NOD OFF OR FALL ASLEEP WHILE WATCHING TV: WOULD NEVER DOZE
HOW LIKELY ARE YOU TO NOD OFF OR FALL ASLEEP WHILE LYING DOWN TO REST IN THE AFTERNOON WHEN CIRCUMSTANCES PERMIT: HIGH CHANCE OF DOZING

## 2025-07-02 ASSESSMENT — PATIENT HEALTH QUESTIONNAIRE - PHQ9: SUM OF ALL RESPONSES TO PHQ QUESTIONS 1-9: 9

## 2025-07-02 NOTE — PATIENT INSTRUCTIONS
"Please do not drive drowsy under any circumstances.  If you find yourself in a situation in which you are driving and feeling sleepy, please pull over right away in a safe place and take a quick nap before getting back on the road.    What is insomnia? -- Insomnia is a problem with sleep. People with insomnia have trouble falling or staying asleep, or they do not feel rested when they wake up. Insomnia is not about the number of hours of sleep a person gets. Everyone needs a different amount of sleep.  What are the symptoms of insomnia? -- People with insomnia often:  ?Have trouble falling or staying asleep  ?Feel tired or sleepy during the day   ?Forget things or have trouble thinking clearly  ?Get cranky, anxious, irritable, or depressed  ?Have less energy or interest in doing things  ?Make mistakes or get into accidents more often than normal  ?Worry about their lack of sleep  These symptoms can be so bad that they affect a person's relationships or work life. Plus, they can happen even in people who seem to be sleeping enough hours.  Are there tests I should have? -- Probably not. Most people with insomnia need no tests. Your doctor or nurse will probably be able to tell what is wrong just by talking to you. He or she might also ask you to keep a daily log for 1 to 2 weeks, where you keep track of how you sleep each night.  In some cases, people do need special sleep tests, such as \"polysomnography\" or \"actigraphy.\"  ?Polysomnography - Polysomnography is a test that usually lasts all night and that is done in a sleep lab. During the test, monitors are attached to your body to record movement, brain activity, breathing, and other body functions.  ?Actigraphy - Actigraphy records activity and movement with a monitor or motion detector that is usually worn on the wrist. The test is done at home, over several days and nights. It will record how much you actually sleep and when.  What can I do to improve my insomnia? " "-- You can follow good \"sleep hygiene.\" That means that you:  ?Sleep only long enough to feel rested and then get out of bed  ?Go to bed and get up at the same time every day  ?Do not try to force yourself to sleep. If you can't sleep, get out of bed and try again later.  ?Have coffee, tea, and other foods that have caffeine only in the morning  ?Avoid alcohol in the late afternoon, evening, and bedtime  ?Avoid smoking, especially in the evening  ?Keep your bedroom dark, cool, quiet, and free of reminders of work or other things that cause you stress  ?Solve problems you have before you go to bed  ?Exercise several days a week, but not right before bed  ?Avoid looking at phones or reading devices (\"e-books\") that give off light before bed. This can make it harder to fall asleep.  Other things that can improve sleep include:  ?Relaxation therapy, in which you focus on relaxing all the muscles in your body one by one  ?Working with a counselor or psychologist to deal with the problems that might be causing poor sleep  Should I see a doctor or nurse? -- Yes. If you have insomnia, and it is troubling you, see your doctor or nurse. He or she might have suggestions on how to fix the problem.  Are there medicines to help me sleep? -- Yes, there are medicines to help with sleep. But you should try them only after you try the techniques described above. You also should not use sleep medicines every night for long periods of time. Otherwise, you can become dependent on them for sleep.  Insomnia is sometimes caused by mental health problems, such as depression or anxiety. If that's the case for you, you might benefit from an antidepressant rather than a sleep aid. Antidepressants often improve sleep and can help with other worries, too.  Can I use alcohol to help me sleep? -- No, do not use alcohol as a sleep aid. Even though alcohol makes you sleepy at first, it disrupts sleep later in the night.    Consider reading \"Say Good " "Night to Insomnia\" by Brian Zhang PhD.    Stimulus control    Stimulus control therapy is based on the idea that some people with poor sleep habits have learned to associate the bed with staying awake rather than sleeping.  ?You should spend no more than 15 minutes lying in bed trying to fall asleep.  ?If you cannot fall asleep within 15 minutes, bring a chair and put it next to your bed and sit in that chair. Try to close your eyes in the dark and focus on your breathing. Activities such as eating, balancing your checkbook, doing housework, or studying for a test, which \"reward\" you for staying awake, should be avoided.  ?When you start to feel sleepy, you can return to bed. If you cannot fall asleep in another 20 minutes, repeat the process.  .      "

## 2025-07-02 NOTE — NURSING NOTE
"Chief Complaint   Patient presents with    Sleep Problem     Insomnia for years. Have tried CPAP and MAD. Wondering about Inspire or other surgery.       Initial /71   Pulse 84   Resp 12   Ht 1.676 m (5' 6\")   Wt 94.6 kg (208 lb 9.6 oz)   SpO2 94%   BMI 33.67 kg/m   Estimated body mass index is 33.67 kg/m  as calculated from the following:    Height as of this encounter: 1.676 m (5' 6\").    Weight as of this encounter: 94.6 kg (208 lb 9.6 oz).    Medication Reconciliation: complete  ESS: 3  Neck circumference: 14.25 inches / 36 centimeters.    DME: N/A    Yeimy Finley MA on 7/2/2025 at 10:59 AM      "

## 2025-07-02 NOTE — PROGRESS NOTES
"Additional 15 minutes on the date of service was spent performing the following:    -Preparing to see the patient  -Obtaining and/or reviewing separately obtained history   -Ordering medications, tests, or procedures   -Documenting clinical information in the electronic or other health record     Thank you for the opportunity to participate in the care of Jacquie Giang.     She is a 65 year old y/o female patient who comes to the sleep medicine clinic for re-establishing care. The patient was diagnosed with ASHWIN on 08/25/2017 (AHI=14.4).The patient wanted to see if there are other recent developments in terms of treatment for ASHWIN.      Assessment and Plan:  In summary Jacquie Giang is a 65 year old year old female who is here for follow up.    1. History of obstructive sleep apnea (Primary)/Chronic fatigue/Snoring/Persistent insomnia  We discussed the treatment option of CPAP, Inspire Implant, oral appliance and GLP-1 medications. We discussed the pros and cons of each option. The patient is already on Semiglutide. She is not interested in the other options. I also recommend that she read the book \"Say Meenakshi to Insomnia\" and will give her a handout on the topic. I welcomed her to follow up with me on an as needed basis.    Lab reviewed: Discussed with patient.    Sleep-Wake Cycle:    TIME IN BED:    1) Work/School Days:    Do you work or go to school? No   What time do you usually get into bed? 10pm   About how long does it take you to fall asleep? Up to 2hrs   How often do you have trouble falling asleep? Most nights   How often do you wake up during the night? 2-3 times?   Do you work days/evenings/nights/rotating shifts?    What wakes you up at night? Use the bathroom    Anxiety    Uncertain   How often do you have trouble falling back to sleep? 7   About how long does it take to fall back to sleep? Up to 2 hrs   What do you usually do if you have trouble getting back to sleep? Get up   What time do you " usually get out of bed to start your day? 10am   Do you use an alarm? Yes   2) Weekends/Non-work Days/All Other Days    What time do you usually get into bed? 10pm   About how long does it take you to fall asleep? Up to 2 hrs   What time do you usually get out of bed to start your day? 10am   Do you use an alarm? Yes   SLEEP NEED    On average, about how much sleep do you think you get? 6   About how much sleep do you think you need? 10-12   SLEEP POSITION    Which sleep positions do you prefer? Side   Do you do any of the following activities in bed?    How often do you take a nap on purpose? 7   About how long are your naps? 2-3 hrs   Do you feel better after naps? Yes   How often do you doze off unintentionally? 0   Have you ever had a driving accident or near-miss due to sleepiness/drowsiness? No     SUSAN:  SUSAN Total Score: 18  Total score - [place]el Hurst: 3 (7/2/2025 10:00 AM)    Failed to redirect to the Timeline version of the [employer] Smart[employer].   Patient Active Problem List   Diagnosis    Dermatitis, eyelid, contact    Anxiety    Hot flashes    Hyperlipidemia    Hypertension    Hypothyroidism    Insomnia    Major depression, recurrent    Medial meniscus tear    Morbid obesity with BMI of 40.0-44.9, adult (H)    ASHWIN on CPAP    Osteoarthritis, knee    Vitamin D insufficiency       Past Medical History:   Diagnosis Date    Anxiety     Hot flashes     Hyperlipidemia     Hypertension     Hypothyroidism     Insomnia     Major depression, recurrent     Medial meniscus tear     Morbid obesity (H)     ASHWIN on CPAP     Osteoarthritis, knee     Vitamin D insufficiency        Past Surgical History:   Procedure Laterality Date    COLONOSCOPY  2011    HYSTERECTOMY RADICAL, SALPINGO-OOPHORECTOMY  1999    [facility] TOTAL KNEE ARTHROPLASTY Right        Current Outpatient Medications   Medication Sig Dispense Refill    ALPRAZOLAM PO Take 1 mg by mouth as needed for anxiety      atorvastatin (LIPITOR) 40 MG tablet Take 40 mg by mouth daily    "   LEVOTHYROXINE SODIUM PO Take 150 mcg by mouth daily       polyethylene glycol (MIRALAX/GLYCOLAX) powder Take 17 g by mouth daily         Allergies   Allergen Reactions    Wellbutrin [Bupropion Hcl]     Cephalexin Rash    Citalopram Rash    Fluoxetine Rash     Physical Exam:  /71   Pulse 84   Resp 12   Ht 1.676 m (5' 6\")   Wt 94.6 kg (208 lb 9.6 oz)   SpO2 94%   BMI 33.67 kg/m    BMI:Body mass index is 33.67 kg/m .   GEN: NAD, obese  Head: Normocephalic.  EYES: EOMI  Psych: normal mood, normal affect  The patient declined any other physical exams.    Labs/Studies:    CBC AND DIFFERENTIAL  Order: [id number]  Component  Ref Range & Units 4 wk ago   WHITE BLOOD CELL COUNT  3.8 - 10.8 Thousand/uL 5.6   RED BLOOD CELL COUNT  3.80 - 5.10 Million/uL 4.89   HEMOGLOBIN  11.7 - 15.5 g/dL 15.3   HEMATOCRIT  35.0 - 45.0 % 45.7 High    MCV  80.0 - 100.0 fL 93.5   MCH  27.0 - 33.0 pg 31.3   MCHC  32.0 - 36.0 g/dL 33.5     TSH WITH REFLEX  Order: [id number]  Component  Ref Range & Units 4 wk ago   TSH W/REFLEX TO FT4  0.40 - 4.50 mIU/L 2.33   [facility]       Component  Ref Range & Units 4 wk ago   GLUCOSE  65 - 99 mg/dL 90   Comment:             Fasting reference interval   UREA NITROGEN (BUN)  7 - 25 mg/dL 27 High    CREATININE  0.50 - 1.05 mg/dL 1.5 High    EGFR  > OR = 60 mL/min/1.73m2 38 Low    BUN/CREATININE RATIO  6 - 22 (calc) 18   SODIUM  135 - 146 mmol/L 139   POTASSIUM  3.5 - 5.3 mmol/L 5.4 High    CHLORIDE  98 - 110 mmol/L 103   CARBON DIOXIDE  20 - 32 mmol/L 25   CALCIUM  8.6 - 10.4 mg/dL 9.9   PROTEIN, TOTAL  6.1 - 8.1 g/dL 7.1   ALBUMIN  3.6 - 5.1 g/dL 4.6   GLOBULIN  1.9 - 3.7 g/dL (calc) 2.5   ALBUMIN/GLOBULIN RATIO  1.0 - 2.5 (calc) 1.8   BILIRUBIN, TOTAL  0.2 - 1.2 mg/dL 0.4   ALKALINE PHOSPHATASE  37 - 153 U/L 30 Low    AST  10 - 35 U/L 18   ALT  6 - 29 U/L 13   [facility]       Patient was strongly advised in AVS to avoid " driving, operating any heavy machinery or other hazardous situations while drowsy or sleepy. Patient was counseled on the importance of driving while alert, to pull over if drowsy, or nap before getting into the vehicle if sleepy.     Patient verbalized understanding of these issues, agrees with the plan and all questions were answered today. Patient was given an opportuntity to voice any other symptoms or concerns not listed above. Patient did not have any other symptoms or concerns.      Javi Varma DO  Board Certified in Internal Medicine and Sleep Medicine    (Note created with Dragon voice recognition and unintended spelling errors and word substitutions may occur)     Audio and visual devices were used for this virtual clinic visit with permission from patient.